# Patient Record
Sex: MALE | Race: WHITE | NOT HISPANIC OR LATINO | Employment: OTHER | URBAN - METROPOLITAN AREA
[De-identification: names, ages, dates, MRNs, and addresses within clinical notes are randomized per-mention and may not be internally consistent; named-entity substitution may affect disease eponyms.]

---

## 2017-03-28 DIAGNOSIS — Z01.812 PRE-OPERATIVE LABORATORY EXAMINATION: ICD-10-CM

## 2017-03-28 DIAGNOSIS — Z01.810 PRE-OPERATIVE CARDIOVASCULAR EXAMINATION: ICD-10-CM

## 2017-03-28 LAB
ANION GAP SERPL CALC-SCNC: 9 MMOL/L (ref 0–11.9)
BUN SERPL-MCNC: 12 MG/DL (ref 8–22)
CALCIUM SERPL-MCNC: 9.4 MG/DL (ref 8.5–10.5)
CHLORIDE SERPL-SCNC: 99 MMOL/L (ref 96–112)
CO2 SERPL-SCNC: 25 MMOL/L (ref 20–33)
CREAT SERPL-MCNC: 0.81 MG/DL (ref 0.5–1.4)
ERYTHROCYTE [DISTWIDTH] IN BLOOD BY AUTOMATED COUNT: 41.8 FL (ref 35.9–50)
GFR SERPL CREATININE-BSD FRML MDRD: >60 ML/MIN/1.73 M 2
GLUCOSE SERPL-MCNC: 104 MG/DL (ref 65–99)
HCT VFR BLD AUTO: 45.1 % (ref 42–52)
HGB BLD-MCNC: 15.8 G/DL (ref 14–18)
MCH RBC QN AUTO: 31.5 PG (ref 27–33)
MCHC RBC AUTO-ENTMCNC: 35 G/DL (ref 33.7–35.3)
MCV RBC AUTO: 89.8 FL (ref 81.4–97.8)
PLATELET # BLD AUTO: 318 K/UL (ref 164–446)
PMV BLD AUTO: 9.2 FL (ref 9–12.9)
POTASSIUM SERPL-SCNC: 3.8 MMOL/L (ref 3.6–5.5)
RBC # BLD AUTO: 5.02 M/UL (ref 4.7–6.1)
SODIUM SERPL-SCNC: 133 MMOL/L (ref 135–145)
WBC # BLD AUTO: 8.1 K/UL (ref 4.8–10.8)

## 2017-03-28 PROCEDURE — 85027 COMPLETE CBC AUTOMATED: CPT

## 2017-03-28 PROCEDURE — 36415 COLL VENOUS BLD VENIPUNCTURE: CPT

## 2017-03-28 PROCEDURE — 80048 BASIC METABOLIC PNL TOTAL CA: CPT

## 2017-03-30 LAB — EKG IMPRESSION: NORMAL

## 2017-04-04 ENCOUNTER — TELEPHONE (OUTPATIENT)
Dept: MEDICAL GROUP | Facility: MEDICAL CENTER | Age: 67
End: 2017-04-04

## 2017-04-04 ENCOUNTER — HOSPITAL ENCOUNTER (INPATIENT)
Facility: MEDICAL CENTER | Age: 67
LOS: 3 days | DRG: 331 | End: 2017-04-07
Attending: SURGERY | Admitting: SURGERY
Payer: MEDICARE

## 2017-04-04 PROBLEM — D12.6 BENIGN NEOPLASM OF COLON: Status: ACTIVE | Noted: 2017-04-04

## 2017-04-04 PROCEDURE — 502626 HCHG SURGIFLO HEMOSTATIC MATRIX 6ML: Performed by: SURGERY

## 2017-04-04 PROCEDURE — 700105 HCHG RX REV CODE 258: Performed by: SURGERY

## 2017-04-04 PROCEDURE — 160031 HCHG SURGERY MINUTES - 1ST 30 MINS LEVEL 5: Performed by: SURGERY

## 2017-04-04 PROCEDURE — 500515 HCHG ENDOCLOSE SUTURING DEVICE: Performed by: SURGERY

## 2017-04-04 PROCEDURE — 502714 HCHG ROBOTIC SURGERY SERVICES: Performed by: SURGERY

## 2017-04-04 PROCEDURE — 700111 HCHG RX REV CODE 636 W/ 250 OVERRIDE (IP): Performed by: SURGERY

## 2017-04-04 PROCEDURE — 500378 HCHG DRAIN, J-VAC ROUND 19FR: Performed by: SURGERY

## 2017-04-04 PROCEDURE — 501570 HCHG TROCAR, SEPARATOR: Performed by: SURGERY

## 2017-04-04 PROCEDURE — 770006 HCHG ROOM/CARE - MED/SURG/GYN SEMI*

## 2017-04-04 PROCEDURE — 501428 HCHG STAPLER, CURVED: Performed by: SURGERY

## 2017-04-04 PROCEDURE — 502648 HCHG APPLICATOR, EVICEL: Performed by: SURGERY

## 2017-04-04 PROCEDURE — 500389 HCHG DRAIN, RESERVOIR SUCT JP 100CC: Performed by: SURGERY

## 2017-04-04 PROCEDURE — 700101 HCHG RX REV CODE 250

## 2017-04-04 PROCEDURE — 160036 HCHG PACU - EA ADDL 30 MINS PHASE I: Performed by: SURGERY

## 2017-04-04 PROCEDURE — 110371 HCHG SHELL REV 272: Performed by: SURGERY

## 2017-04-04 PROCEDURE — 500002 HCHG ADHESIVE, DERMABOND: Performed by: SURGERY

## 2017-04-04 PROCEDURE — 110382 HCHG SHELL REV 271: Performed by: SURGERY

## 2017-04-04 PROCEDURE — 501838 HCHG SUTURE GENERAL: Performed by: SURGERY

## 2017-04-04 PROCEDURE — A4314 CATH W/DRAINAGE 2-WAY LATEX: HCPCS | Performed by: SURGERY

## 2017-04-04 PROCEDURE — 160009 HCHG ANES TIME/MIN: Performed by: SURGERY

## 2017-04-04 PROCEDURE — 160002 HCHG RECOVERY MINUTES (STAT): Performed by: SURGERY

## 2017-04-04 PROCEDURE — 500025 HCHG ARMREST, CRADLE FOAM: Performed by: SURGERY

## 2017-04-04 PROCEDURE — 160048 HCHG OR STATISTICAL LEVEL 1-5: Performed by: SURGERY

## 2017-04-04 PROCEDURE — 0DBN0ZZ EXCISION OF SIGMOID COLON, OPEN APPROACH: ICD-10-PCS | Performed by: SURGERY

## 2017-04-04 PROCEDURE — 0WJP4ZZ INSPECTION OF GASTROINTESTINAL TRACT, PERCUTANEOUS ENDOSCOPIC APPROACH: ICD-10-PCS | Performed by: SURGERY

## 2017-04-04 PROCEDURE — A4606 OXYGEN PROBE USED W OXIMETER: HCPCS | Performed by: SURGERY

## 2017-04-04 PROCEDURE — 700111 HCHG RX REV CODE 636 W/ 250 OVERRIDE (IP)

## 2017-04-04 PROCEDURE — 501338 HCHG SHEARS, ENDO: Performed by: SURGERY

## 2017-04-04 PROCEDURE — 0DBP0ZZ EXCISION OF RECTUM, OPEN APPROACH: ICD-10-PCS | Performed by: SURGERY

## 2017-04-04 PROCEDURE — A9270 NON-COVERED ITEM OR SERVICE: HCPCS

## 2017-04-04 PROCEDURE — 700102 HCHG RX REV CODE 250 W/ 637 OVERRIDE(OP): Performed by: SURGERY

## 2017-04-04 PROCEDURE — 700104 HCHG RX REV CODE 254

## 2017-04-04 PROCEDURE — 160042 HCHG SURGERY MINUTES - EA ADDL 1 MIN LEVEL 5: Performed by: SURGERY

## 2017-04-04 PROCEDURE — 8E0W4CZ ROBOTIC ASSISTED PROCEDURE OF TRUNK REGION, PERCUTANEOUS ENDOSCOPIC APPROACH: ICD-10-PCS | Performed by: SURGERY

## 2017-04-04 PROCEDURE — A4450 NON-WATERPROOF TAPE: HCPCS | Performed by: SURGERY

## 2017-04-04 PROCEDURE — 700102 HCHG RX REV CODE 250 W/ 637 OVERRIDE(OP)

## 2017-04-04 PROCEDURE — 88307 TISSUE EXAM BY PATHOLOGIST: CPT

## 2017-04-04 PROCEDURE — 700101 HCHG RX REV CODE 250: Performed by: SURGERY

## 2017-04-04 PROCEDURE — A9270 NON-COVERED ITEM OR SERVICE: HCPCS | Performed by: SURGERY

## 2017-04-04 PROCEDURE — A6402 STERILE GAUZE <= 16 SQ IN: HCPCS | Performed by: SURGERY

## 2017-04-04 PROCEDURE — 160035 HCHG PACU - 1ST 60 MINS PHASE I: Performed by: SURGERY

## 2017-04-04 PROCEDURE — 502240 HCHG MISC OR SUPPLY RC 0272: Performed by: SURGERY

## 2017-04-04 RX ORDER — KETOROLAC TROMETHAMINE 30 MG/ML
15 INJECTION, SOLUTION INTRAMUSCULAR; INTRAVENOUS EVERY 6 HOURS
Status: DISPENSED | OUTPATIENT
Start: 2017-04-04 | End: 2017-04-07

## 2017-04-04 RX ORDER — DIPHENHYDRAMINE HCL 25 MG
25 TABLET ORAL EVERY 6 HOURS PRN
Status: DISCONTINUED | OUTPATIENT
Start: 2017-04-04 | End: 2017-04-07 | Stop reason: HOSPADM

## 2017-04-04 RX ORDER — GABAPENTIN 300 MG/1
CAPSULE ORAL
Status: COMPLETED
Start: 2017-04-04 | End: 2017-04-04

## 2017-04-04 RX ORDER — SODIUM CHLORIDE, SODIUM LACTATE, POTASSIUM CHLORIDE, CALCIUM CHLORIDE 600; 310; 30; 20 MG/100ML; MG/100ML; MG/100ML; MG/100ML
1000 INJECTION, SOLUTION INTRAVENOUS
Status: COMPLETED | OUTPATIENT
Start: 2017-04-04 | End: 2017-04-04

## 2017-04-04 RX ORDER — MORPHINE SULFATE 4 MG/ML
4 INJECTION, SOLUTION INTRAMUSCULAR; INTRAVENOUS
Status: DISCONTINUED | OUTPATIENT
Start: 2017-04-04 | End: 2017-04-07 | Stop reason: HOSPADM

## 2017-04-04 RX ORDER — ONDANSETRON 2 MG/ML
4 INJECTION INTRAMUSCULAR; INTRAVENOUS EVERY 4 HOURS PRN
Status: DISCONTINUED | OUTPATIENT
Start: 2017-04-04 | End: 2017-04-07 | Stop reason: HOSPADM

## 2017-04-04 RX ORDER — CELECOXIB 200 MG/1
CAPSULE ORAL
Status: COMPLETED
Start: 2017-04-04 | End: 2017-04-04

## 2017-04-04 RX ORDER — ALLOPURINOL 300 MG/1
300 TABLET ORAL DAILY
Status: DISCONTINUED | OUTPATIENT
Start: 2017-04-04 | End: 2017-04-07 | Stop reason: HOSPADM

## 2017-04-04 RX ORDER — DEXTROSE MONOHYDRATE, SODIUM CHLORIDE, AND POTASSIUM CHLORIDE 50; 1.49; 9 G/1000ML; G/1000ML; G/1000ML
INJECTION, SOLUTION INTRAVENOUS CONTINUOUS
Status: DISCONTINUED | OUTPATIENT
Start: 2017-04-04 | End: 2017-04-05

## 2017-04-04 RX ORDER — BUPIVACAINE HYDROCHLORIDE AND EPINEPHRINE 5; 5 MG/ML; UG/ML
INJECTION, SOLUTION EPIDURAL; INTRACAUDAL; PERINEURAL
Status: DISCONTINUED | OUTPATIENT
Start: 2017-04-04 | End: 2017-04-04 | Stop reason: HOSPADM

## 2017-04-04 RX ORDER — AMLODIPINE BESYLATE 10 MG/1
5 TABLET ORAL
Status: DISCONTINUED | OUTPATIENT
Start: 2017-04-05 | End: 2017-04-07 | Stop reason: HOSPADM

## 2017-04-04 RX ORDER — LIDOCAINE HYDROCHLORIDE 10 MG/ML
INJECTION, SOLUTION INFILTRATION; PERINEURAL
Status: COMPLETED
Start: 2017-04-04 | End: 2017-04-04

## 2017-04-04 RX ORDER — OXYCODONE HYDROCHLORIDE 5 MG/1
5 TABLET ORAL
Status: DISCONTINUED | OUTPATIENT
Start: 2017-04-04 | End: 2017-04-07 | Stop reason: HOSPADM

## 2017-04-04 RX ORDER — VALSARTAN 80 MG/1
160 TABLET ORAL DAILY
Status: DISCONTINUED | OUTPATIENT
Start: 2017-04-04 | End: 2017-04-07 | Stop reason: HOSPADM

## 2017-04-04 RX ORDER — OXYCODONE HCL 10 MG/1
TABLET, FILM COATED, EXTENDED RELEASE ORAL
Status: DISPENSED
Start: 2017-04-04 | End: 2017-04-04

## 2017-04-04 RX ORDER — OXYCODONE HYDROCHLORIDE 10 MG/1
10 TABLET ORAL
Status: DISCONTINUED | OUTPATIENT
Start: 2017-04-04 | End: 2017-04-07 | Stop reason: HOSPADM

## 2017-04-04 RX ORDER — TRIAMTERENE AND HYDROCHLOROTHIAZIDE 37.5; 25 MG/1; MG/1
1 TABLET ORAL
Status: DISCONTINUED | OUTPATIENT
Start: 2017-04-04 | End: 2017-04-07 | Stop reason: HOSPADM

## 2017-04-04 RX ORDER — ACETAMINOPHEN 500 MG
TABLET ORAL
Status: COMPLETED
Start: 2017-04-04 | End: 2017-04-04

## 2017-04-04 RX ORDER — ACETAMINOPHEN 500 MG
1000 TABLET ORAL EVERY 6 HOURS
Status: DISCONTINUED | OUTPATIENT
Start: 2017-04-04 | End: 2017-04-07 | Stop reason: HOSPADM

## 2017-04-04 RX ADMIN — TRIAMTERENE AND HYDROCHLOROTHIAZIDE 1 TABLET: 37.5; 25 TABLET ORAL at 13:01

## 2017-04-04 RX ADMIN — KETOROLAC TROMETHAMINE 15 MG: 30 INJECTION, SOLUTION INTRAMUSCULAR; INTRAVENOUS at 12:52

## 2017-04-04 RX ADMIN — CELECOXIB 400 MG: 200 CAPSULE ORAL at 07:30

## 2017-04-04 RX ADMIN — ALLOPURINOL 300 MG: 300 TABLET ORAL at 12:53

## 2017-04-04 RX ADMIN — LIDOCAINE HYDROCHLORIDE: 10 INJECTION, SOLUTION INFILTRATION; PERINEURAL at 06:15

## 2017-04-04 RX ADMIN — ACETAMINOPHEN 1000 MG: 500 TABLET, FILM COATED ORAL at 17:39

## 2017-04-04 RX ADMIN — KETOROLAC TROMETHAMINE 15 MG: 30 INJECTION, SOLUTION INTRAMUSCULAR; INTRAVENOUS at 23:13

## 2017-04-04 RX ADMIN — ACETAMINOPHEN 1000 MG: 500 TABLET, FILM COATED ORAL at 12:54

## 2017-04-04 RX ADMIN — GABAPENTIN 300 MG: 300 CAPSULE ORAL at 07:30

## 2017-04-04 RX ADMIN — KETOROLAC TROMETHAMINE 15 MG: 30 INJECTION, SOLUTION INTRAMUSCULAR; INTRAVENOUS at 17:39

## 2017-04-04 RX ADMIN — POTASSIUM CHLORIDE, DEXTROSE MONOHYDRATE AND SODIUM CHLORIDE: 150; 5; 900 INJECTION, SOLUTION INTRAVENOUS at 12:55

## 2017-04-04 RX ADMIN — CEFOTETAN DISODIUM 2 G: 1 INJECTION, POWDER, FOR SOLUTION INTRAMUSCULAR; INTRAVENOUS at 21:52

## 2017-04-04 RX ADMIN — ACETAMINOPHEN 1000 MG: 500 TABLET, FILM COATED ORAL at 07:30

## 2017-04-04 RX ADMIN — SODIUM CHLORIDE, SODIUM LACTATE, POTASSIUM CHLORIDE, CALCIUM CHLORIDE 1000 ML: 600; 310; 30; 20 INJECTION, SOLUTION INTRAVENOUS at 06:25

## 2017-04-04 ASSESSMENT — PAIN SCALES - GENERAL
PAINLEVEL_OUTOF10: 0
PAINLEVEL_OUTOF10: 2
PAINLEVEL_OUTOF10: 0
PAINLEVEL_OUTOF10: 1
PAINLEVEL_OUTOF10: 0

## 2017-04-04 ASSESSMENT — LIFESTYLE VARIABLES
TOTAL SCORE: 0
TOTAL SCORE: 0
EVER_SMOKED: YES
HOW MANY TIMES IN THE PAST YEAR HAVE YOU HAD 5 OR MORE DRINKS IN A DAY: 200
TOTAL SCORE: 0
ON A TYPICAL DAY WHEN YOU DRINK ALCOHOL HOW MANY DRINKS DO YOU HAVE: 4
CONSUMPTION TOTAL: POSITIVE
HAVE PEOPLE ANNOYED YOU BY CRITICIZING YOUR DRINKING: NO
HAVE PEOPLE ANNOYED YOU BY CRITICIZING YOUR DRINKING: NO
TOTAL SCORE: 0
TOTAL SCORE: 0
AVERAGE NUMBER OF DAYS PER WEEK YOU HAVE A DRINK CONTAINING ALCOHOL: 5
EVER HAD A DRINK FIRST THING IN THE MORNING TO STEADY YOUR NERVES TO GET RID OF A HANGOVER: NO
AVERAGE NUMBER OF DAYS PER WEEK YOU HAVE A DRINK CONTAINING ALCOHOL: 5
EVER FELT BAD OR GUILTY ABOUT YOUR DRINKING: NO
ALCOHOL_USE: YES
ON A TYPICAL DAY WHEN YOU DRINK ALCOHOL HOW MANY DRINKS DO YOU HAVE: 4
TOTAL SCORE: 0
HOW MANY TIMES IN THE PAST YEAR HAVE YOU HAD 5 OR MORE DRINKS IN A DAY: 200
CONSUMPTION TOTAL: POSITIVE
EVER FELT BAD OR GUILTY ABOUT YOUR DRINKING: NO
HAVE YOU EVER FELT YOU SHOULD CUT DOWN ON YOUR DRINKING: NO
DO YOU DRINK ALCOHOL: YES
HAVE YOU EVER FELT YOU SHOULD CUT DOWN ON YOUR DRINKING: NO
EVER HAD A DRINK FIRST THING IN THE MORNING TO STEADY YOUR NERVES TO GET RID OF A HANGOVER: NO

## 2017-04-04 ASSESSMENT — PATIENT HEALTH QUESTIONNAIRE - PHQ9
SUM OF ALL RESPONSES TO PHQ QUESTIONS 1-9: 0
1. LITTLE INTEREST OR PLEASURE IN DOING THINGS: NOT AT ALL
2. FEELING DOWN, DEPRESSED, IRRITABLE, OR HOPELESS: NOT AT ALL
SUM OF ALL RESPONSES TO PHQ9 QUESTIONS 1 AND 2: 0

## 2017-04-04 NOTE — TELEPHONE ENCOUNTER
Future Appointments      Provider Department Center   4/17/2017 9:40 AM Darrius Magaña M.D.; [facility] [address] [facility]     ANNUAL WELLNESS VISIT PRE-VISIT PLANNING     1.  Reviewed last PCP office visit assessment and plan notes: Yes  2.  If any orders were placed last visit do we have Results/Consult Notes?        •  Labs? Not completed        •  Imaging? No        •  Referrals? No   3.   Patient Care Coordination Note was updated with  Diagnosis information: yes no note  4.   Updated immunizations by using WebIZ?: yes  · Is patient due for Shingles? Yes.  If yes, was patient alerted of copay? yes   5.   Has the patient had the flu vaccine this season? Yes.  6.   Has patient had the pneumococcal vaccine? Yes. If yes which vaccine was administered? 13 When was it administered? 12/27/16  7.   Last office visit 12/27/16          Patient is due for these Health Maintenance Topics:   Health Maintenance Due   Topic Date Due   • Annual Wellness Visit  Scheduled for 04/17/17   • IMM ZOSTER VACCINE  04/28/2011     8.    Updated Care Team with [employer] and all specialists?        •   Gait devices, O2, CPAP, etc: no        •   Other specialists (GYN, cardiology, endo, etc): yes        •   Eye professional: yes When was last eye exam? 6-7 months        •   OUSMANE letter will be faxed to:  Eye Dr for Retinal Exam records    9. DPA/Advanced Directive:  Patient does not have an Advanced Directive.  A packet and workshop information was given on Advanced Directives.    10. Patient has: No chronic diseases to review    11.  Is patient in need of any refills prior to office visit? No  12. Patient was informed to arrive 15 min prior to their scheduled appointment and bring in their medication bottles? yes  13. Patient was advised: “This is a free wellness visit. The provider will screen for medical conditions to help you stay healthy. If you have other concerns to address you may be asked to discuss  these at a separate visit or there may be an additional fee.”  Yes

## 2017-04-04 NOTE — IP AVS SNAPSHOT
" Home Care Instructions                                                                                                                  Name:Giovanny Pack  Medical Record Number:[id number]  CSN: [id number]    YOB: 1950   Age: 66 y.o.  Sex: male  HT:1.727 m (5' 7.99\") WT: 98.3 kg (216 lb 11.4 oz)          Admit Date: 4/4/2017     Discharge Date:   Today's Date: 4/7/2017  Attending Doctor:  Grayson Chan M.D.                  Allergies:  Review of patient's allergies indicates no known allergies.            Discharge Instructions       D/c IV  PT MAY SHOWER  NO LIFTING >20 LBS  LEAVE DRAIN IN PLACE    Discharge Instructions    Discharged to home by car with relative. Discharged via wheelchair, hospital escort: Yes.  Special equipment needed: Not Applicable    Be sure to schedule a follow-up appointment with your primary care doctor or any specialists as instructed.     Discharge Plan:   Diet Plan: Discussed  Activity Level: Discussed  Confirmed Follow up Appointment: Patient to Call and Schedule Appointment  Confirmed Symptoms Management: Discussed  Medication Reconciliation Updated: Yes  Influenza Vaccine Indication: Not indicated: Previously immunized this influenza season and > 8 years of age    I understand that a diet low in cholesterol, fat, and sodium is recommended for good health. Unless I have been given specific instructions below for another diet, I accept this instruction as my diet prescription.   Other diet: Regular    Special Instructions: None    · Is patient discharged on Warfarin / Coumadin?   No     · Is patient Post Blood Transfusion?  No    Depression / Suicide Risk    As you are discharged from this [facility] facility, it is important to learn how to keep safe from harming yourself.    Recognize the warning signs:  · Abrupt changes in personality, positive or negative- including increase in energy   · Giving away possessions  · Change in eating patterns- significant weight " changes-  positive or negative  · Change in sleeping patterns- unable to sleep or sleeping all the time   · Unwillingness or inability to communicate  · Depression  · Unusual sadness, discouragement and loneliness  · Talk of wanting to die  · Neglect of personal appearance   · Rebelliousness- reckless behavior  · Withdrawal from people/activities they love  · Confusion- inability to concentrate     If you or a loved one observes any of these behaviors or has concerns about self-harm, here's what you can do:  · Talk about it- your feelings and reasons for harming yourself  · Remove any means that you might use to hurt yourself (examples: pills, rope, extension cords, firearm)  · Get professional help from the community (Mental Health, Substance Abuse, psychological counseling)  · Do not be alone:Call your Safe Contact- someone whom you trust who will be there for you.  · Call your local CRISIS HOTLINE 710-5490 or 086-640-2731  · Call your local Children's Mobile Crisis Response Team [place] (902) 312-2082 or www.MediaLAB  · Call the toll free National Suicide Prevention Hotlines   · National Suicide Prevention Lifeline 946-683-ALAG (5606)  · National Hope Line Network 800-SUICIDE (183-9391)        Your appointments     Apr 17, 2017  9:40 AM   ANNUAL WELLNESS with Darrius Magaña M.D., [facility]    [address] ([address] [employer])    [address] [place] [address] [address]  [employer] [place] [address] [phone]              Follow-up Information     1. Follow up with Grayson Chan M.D.. Call in 1 week.    Specialties:  Surgery, Radiology    Contact information     [address]  [address]  [employer] [place] [address] [phone]          2. Schedule an appointment as soon as possible for a visit with Darrius Magaña M.D..    Specialty:  Internal Medicine    Why:  As needed    Contact information     [address] [facility]  [address]  [employer] [place] [id number] [phone]           Discharge Medication  Instructions:    Below are the medications your physician expects you to take upon discharge:    Review all your home medications and newly ordered medications with your doctor and/or pharmacist. Follow medication instructions as directed by your doctor and/or pharmacist.    Please keep your medication list with you and share with your physician.               Medication List      START taking these medications        Instructions    oxycodone-acetaminophen 5-325 MG Tabs   Commonly known as:  PERCOCET    Take 1-2 Tabs by mouth every four hours as needed (PAIN).   Dose:  1-2 Tab         CONTINUE taking these medications        Instructions    allopurinol 300 MG Tabs   Last time this was given:  300 mg on 4/7/2017  7:40 AM   Commonly known as:  ZYLOPRIM    TAKE 1 TABLET BY MOUTH DAILY       amlodipine 5 MG Tabs   Last time this was given:  5 mg on 4/7/2017  7:39 AM   Commonly known as:  NORVASC    TAKE 1 TABLET BY MOUTH DAILY       triamterene-hctz 37.5-25 MG Tabs   Last time this was given:  1 Tab on 4/7/2017  7:39 AM   Commonly known as:  MAXZIDE-25/DYAZIDE    TAKE 1 TABLET BY MOUTH DAILY       valsartan 160 MG Tabs   Last time this was given:  160 mg on 4/7/2017  7:39 AM   Commonly known as:  DIOVAN    TAKE 1 TAB BY MOUTH EVERY DAY. INDICATIONS: HIGH BLOOD PRESSURE         STOP taking these medications     MIRALAX PO               Instructions           Diet / Nutrition:    Follow any diet instructions given to you by your doctor or the dietician, including how much salt (sodium) you are allowed each day.    If you are overweight, talk to your doctor about a weight reduction plan.    Activity:    Remain physically active following your doctor's instructions about exercise and activity.    Rest often.     Any time you become even a little tired or short of breath, SIT DOWN and rest.    Worsening Symptoms:    Report any of the following signs and symptoms to the doctor's office immediately:    *Pain of jaw, arm, or  neck  *Chest pain not relieved by medication                               *Dizziness or loss of consciousness  *Difficulty breathing even when at rest   *More tired than usual                                       *Bleeding drainage or swelling of surgical site  *Swelling of feet, ankles, legs or stomach                 *Fever (>100ºF)  *Pink or blood tinged sputum  *Weight gain (3lbs/day or 5lbs /week)           *Shock from internal defibrillator (if applicable)  *Palpitations or irregular heartbeats                *Cool and/or numb extremities    Stroke Awareness    Common Risk Factors for Stroke include:    Age  Atrial Fibrillation  Carotid Artery Stenosis  Diabetes Mellitus  Excessive alcohol consumption  High blood pressure  Overweight   Physical inactivity  Smoking    Warning signs and symptoms of a stroke include:    *Sudden numbness or weakness of the face, arm or leg (especially on one side of the body).  *Sudden confusion, trouble speaking or understanding.  *Sudden trouble seeing in one or both eyes.  *Sudden trouble walking, dizziness, loss of balance or coordination.Sudden severe headache with no known cause.    It is very important to get treatment quickly when a stroke occurs. If you experience any of the above warning signs, call 911 immediately.                   Disclaimer         Quit Smoking / Tobacco Use:    I understand the use of any tobacco products increases my chance of suffering from future heart disease or stroke and could cause other illnesses which may shorten my life. Quitting the use of tobacco products is the single most important thing I can do to improve my health. For further information on smoking / tobacco cessation call a Toll Free Quit Line at 1-983.300.8228 (*National [employer] [place]) or 1-145.470.7980 (American Lung Association) or you can access the web based program at www.lungusa.org.    [place] Tobacco Users Help Line:  (836) 915-4174       Toll Free:  2-456-874-5203  Quit Tobacco Program [facility] (823)834-5825    Crisis Hotline:    National Crisis Hotline:  3-618-LAOYIVJ or 1-601.292.3242    [place] Crisis Hotline:    1-804.658.1503 or 712-578-2737    Discharge Survey:   Thank you for choosing [facility]. We hope we did everything we could to make your hospital stay a pleasant one. You may be receiving a phone survey and we would appreciate your time and participation in answering the questions. Your input is very valuable to us in our efforts to improve our service to our patients and their families.        My signature on this form indicates that:    1. I have reviewed and understand the above information.  2. My questions regarding this information have been answered to my satisfaction.  3. I have formulated a plan with my discharge nurse to obtain my prescribed medications for home.                  Disclaimer         __________________________________                     __________       ________                       Patient Signature                                                 Date                    Time

## 2017-04-04 NOTE — IP AVS SNAPSHOT
" <p align=\"LEFT\"><IMG SRC=\"//[email or web address]$/[email or web address]\" alt=\"Image\" WIDTH=\"50%\" HEIGHT=\"200\" BORDER=\"\"></p>                   Name:Giovanny Pack  Medical Record Number:[id number]  CSN: [id number]    YOB: 1950   Age: 66 y.o.  Sex: male  HT:1.727 m (5' 7.99\") WT: 98.3 kg (216 lb 11.4 oz)          Admit Date: 4/4/2017     Discharge Date:   Today's Date: 4/7/2017  Attending Doctor:  Grayson Chan M.D.                  Allergies:  Review of patient's allergies indicates no known allergies.          Your appointments     Apr 17, 2017  9:40 AM   ANNUAL WELLNESS with Darri[facility] Magaña M.D., [facility]   [address])    [address] [place] [id number] [phone]              Follow-up Information     1. Follow up with Grayson Chan M.D.. Call in 1 week.    Specialties:  Surgery, Radiology    Contact information    [address] [place] [address] [address]  [address]  [facility] [address] [phone]          2. Schedule an appointment as soon as possible for a visit with Darrius Magaña M.D..    Specialty:  Internal Medicine    Why:  As needed    Contact information    [address] [place] [id number] [phone]           Medication List      Take these Medications        Instructions    allopurinol 300 MG Tabs   Commonly known as:  ZYLOPRIM    TAKE 1 TABLET BY MOUTH DAILY       amlodipine 5 MG Tabs   Commonly known as:  NORVASC    TAKE 1 TABLET BY MOUTH DAILY       oxycodone-acetaminophen 5-325 MG Tabs   Commonly known as:  PERCOCET    Take 1-2 Tabs by mouth every four hours as needed (PAIN).   Dose:  1-2 Tab       triamterene-hctz 37.5-25 MG Tabs   Commonly known as:  MAXZIDE-25/DYAZIDE    TAKE 1 TABLET BY MOUTH DAILY       valsartan 160 MG Tabs   Commonly known as:  DIOVAN    TAKE 1 TAB BY MOUTH EVERY DAY. INDICATIONS: HIGH BLOOD PRESSURE         "

## 2017-04-04 NOTE — OR NURSING
Pt up and standing at side of bed/walking in place - tolerated well.  Will be transported to floor by wheelchair.  Continues to deny pain.  Maral AGUILAR will assume care of pt while waiting for transport.

## 2017-04-04 NOTE — PROGRESS NOTES
Received PACU report and assumed care of patient upon arrival to [facility] in T427-1.  Assessment complete; discussed POC with patient.  AO x4, patient calls for assistance.  Patient appears calm and exhibits no signs of distress.  Patient reports pain of 1/10, will medicate per MAR PRN.  5x lap stabs KIRTI with dermabond, left abdominal ALEXANDER in place with moderate sanguinous drainage.  Patient tolerating current diet.  BS hypoactive x 4, LBM PTA 4/4/17, patient voids with norwood in place.  Patient is standby assist, gait steady, walked from gurney to bed.  Call light within reach, bed in lowest position, SCDs in use, bed alarm refused.  Will continue to monitor pt for changes in status and provide care.

## 2017-04-04 NOTE — IP AVS SNAPSHOT
Greenpie Access Code: Activation code not generated  Current Greenpie Status: Active    IMAGINATE - [employer]  A secure, online tool to manage your health information     Technology [employer]’s Greenpie® is a secure, online tool that connects you to your personalized health information from the privacy of your home -- day or night - making it very easy for you to manage your healthcare. Once the activation process is completed, you can even access your medical information using the Greenpie lisbeth, which is available for free in the Apple Lisbeth store or Google Play store.     Greenpie provides the following levels of access (as shown below):   My Chart Features   [facility] [place] [facility] Primary Care Doctor [facility] [place] [facility]  Specialists [facility] [place] [facility]  Urgent  Care Non-[facility]  Primary Care  Doctor   Email your healthcare team securely and privately 24/7 X X X X   Manage appointments: schedule your next appointment; view details of past/upcoming appointments X      Request prescription refills. X      View recent personal medical records, including lab and immunizations X X X X   View health record, including health history, allergies, medications X X X X   Read reports about your outpatient visits, procedures, consult and ER notes X X X X   See your discharge summary, which is a recap of your hospital and/or ER visit that includes your diagnosis, lab results, and care plan. X X       How to register for Greenpie:  1. Go to  [email or web address].  2. Click on the Sign Up Now box, which takes you to the New Member Sign Up page. You will need to provide the following information:  a. Enter your Greenpie Access Code exactly as it appears at the top of this page. (You will not need to use this code after you’ve completed the sign-up process. If you do not sign up before the expiration date, you must request a new code.)   b. Enter your date of birth.   c. Enter your home email address.   d. Click Submit, and follow the next screen’s instructions.  3. Create a Greenpie ID. This will  be your [employer] login ID and cannot be changed, so think of one that is secure and easy to remember.  4. Create a [employer] password. You can change your password at any time.  5. Enter your Password Reset Question and Answer. This can be used at a later time if you forget your password.   6. Enter your e-mail address. This allows you to receive e-mail notifications when new information is available in [employer].  7. Click Sign Up. You can now view your health information.    For assistance activating your [employer] account, call [phone]

## 2017-04-04 NOTE — CARE PLAN
Problem: Pain  Goal: Alleviation of Pain or a reduction in pain to the patient’s comfort goal  Outcome: PROGRESSING AS EXPECTED  Assessed pain and medicated per MAR.  Reminded patient to report any changes in severity or quality of pain in order to best manage pain.    Problem: Risk for Infection, Impaired Wound Healing  Goal: Remain free from signs and symptoms of infection  Outcome: PROGRESSING AS EXPECTED  Reminded patient of the signs and symptoms of infection and encouraged patient to report any of these findings in order to promote best outcomes.

## 2017-04-04 NOTE — IP AVS SNAPSHOT
4/7/2017          Giovanny Pack  [address]  [place] [address]    Dear Giovanny:    [facility] wants to ensure your discharge home is safe and you or your loved ones have had all your questions answered regarding your care after you leave the hospital.    You may receive a telephone call within two days of your discharge.  This call is to make certain you understand your discharge instructions as well as ensure we provided you with the best care possible during your stay with us.     The call will only last approximately 3-5 minutes and will be done by a nurse.    Once again, we want to ensure your discharge home is safe and that you have a clear understanding of any next steps in your care.  If you have any questions or concerns, please do not hesitate to contact us, we are here for you.  Thank you for choosing [facility] for your healthcare needs.    Sincerely,    Morgan Jeffers    [facility]

## 2017-04-04 NOTE — OR SURGEON
Immediate Post-Operative Note      PreOp Diagnosis: Colon Polyp    PostOp Diagnosis: same    Procedure(s):  LOW ANTERIOR RESECTION ROBOTIC XI - Wound Class: Clean Contaminated    Surgeon(s):  Grayson Chan M.D.    Anesthesiologist/Type of Anesthesia:  Anesthesiologist: Hailee Romero M.D./General    Surgical Staff:  Circulator: Teddy Castano R.N.; Kassy Osborn R.N.  Relief Circulator: Abida Caicedo R.N.  Scrub Person: Marlene Adams; Lavon Song  First Assist: YOUNG Hu    Specimen: Sigmoid Colon and Proximal Rectum    Estimated Blood Loss: 25cc    Findings: Fatty mesentery, Sigmoid Colon removed down to proximal rectum.  ColoRectal Anastomosis with hemostasis.  Air leak on insufflation test, anterior anastomotic line sutured with resolution of air leak.  19F round drain placed in pelvis.      Complications: none        4/4/2017 10:09 AM Grayson Chan

## 2017-04-04 NOTE — OR NURSING
Pt's contact (Terri) called, message left alerting her pt in recovery and doing well.  Denies pain t/o time in pacu.  Taking apple juice and tolerating well.

## 2017-04-05 LAB
ANION GAP SERPL CALC-SCNC: 8 MMOL/L (ref 0–11.9)
BUN SERPL-MCNC: 20 MG/DL (ref 8–22)
CALCIUM SERPL-MCNC: 9.2 MG/DL (ref 8.5–10.5)
CHLORIDE SERPL-SCNC: 103 MMOL/L (ref 96–112)
CO2 SERPL-SCNC: 26 MMOL/L (ref 20–33)
CREAT SERPL-MCNC: 1.01 MG/DL (ref 0.5–1.4)
ERYTHROCYTE [DISTWIDTH] IN BLOOD BY AUTOMATED COUNT: 44.3 FL (ref 35.9–50)
GFR SERPL CREATININE-BSD FRML MDRD: >60 ML/MIN/1.73 M 2
GLUCOSE SERPL-MCNC: 146 MG/DL (ref 65–99)
HCT VFR BLD AUTO: 40.4 % (ref 42–52)
HGB BLD-MCNC: 13.6 G/DL (ref 14–18)
MAGNESIUM SERPL-MCNC: 1.9 MG/DL (ref 1.5–2.5)
MCH RBC QN AUTO: 31.3 PG (ref 27–33)
MCHC RBC AUTO-ENTMCNC: 33.7 G/DL (ref 33.7–35.3)
MCV RBC AUTO: 92.9 FL (ref 81.4–97.8)
PLATELET # BLD AUTO: 246 K/UL (ref 164–446)
PMV BLD AUTO: 9.1 FL (ref 9–12.9)
POTASSIUM SERPL-SCNC: 4 MMOL/L (ref 3.6–5.5)
RBC # BLD AUTO: 4.35 M/UL (ref 4.7–6.1)
SODIUM SERPL-SCNC: 137 MMOL/L (ref 135–145)
WBC # BLD AUTO: 16.7 K/UL (ref 4.8–10.8)

## 2017-04-05 PROCEDURE — 83735 ASSAY OF MAGNESIUM: CPT

## 2017-04-05 PROCEDURE — 85027 COMPLETE CBC AUTOMATED: CPT

## 2017-04-05 PROCEDURE — 700111 HCHG RX REV CODE 636 W/ 250 OVERRIDE (IP): Performed by: SURGERY

## 2017-04-05 PROCEDURE — 770006 HCHG ROOM/CARE - MED/SURG/GYN SEMI*

## 2017-04-05 PROCEDURE — 80048 BASIC METABOLIC PNL TOTAL CA: CPT

## 2017-04-05 PROCEDURE — 36415 COLL VENOUS BLD VENIPUNCTURE: CPT

## 2017-04-05 PROCEDURE — 700102 HCHG RX REV CODE 250 W/ 637 OVERRIDE(OP): Performed by: SURGERY

## 2017-04-05 PROCEDURE — 700101 HCHG RX REV CODE 250: Performed by: SURGERY

## 2017-04-05 PROCEDURE — A9270 NON-COVERED ITEM OR SERVICE: HCPCS | Performed by: SURGERY

## 2017-04-05 RX ADMIN — ACETAMINOPHEN 1000 MG: 500 TABLET, FILM COATED ORAL at 21:20

## 2017-04-05 RX ADMIN — AMLODIPINE BESYLATE 5 MG: 10 TABLET ORAL at 09:38

## 2017-04-05 RX ADMIN — VALSARTAN 160 MG: 80 TABLET ORAL at 09:38

## 2017-04-05 RX ADMIN — ACETAMINOPHEN 1000 MG: 500 TABLET, FILM COATED ORAL at 05:02

## 2017-04-05 RX ADMIN — KETOROLAC TROMETHAMINE 15 MG: 30 INJECTION, SOLUTION INTRAMUSCULAR; INTRAVENOUS at 21:21

## 2017-04-05 RX ADMIN — KETOROLAC TROMETHAMINE 15 MG: 30 INJECTION, SOLUTION INTRAMUSCULAR; INTRAVENOUS at 05:02

## 2017-04-05 RX ADMIN — POTASSIUM CHLORIDE, DEXTROSE MONOHYDRATE AND SODIUM CHLORIDE: 150; 5; 900 INJECTION, SOLUTION INTRAVENOUS at 05:03

## 2017-04-05 RX ADMIN — ENOXAPARIN SODIUM 40 MG: 100 INJECTION SUBCUTANEOUS at 09:37

## 2017-04-05 RX ADMIN — TRIAMTERENE AND HYDROCHLOROTHIAZIDE 1 TABLET: 37.5; 25 TABLET ORAL at 09:38

## 2017-04-05 RX ADMIN — ALLOPURINOL 300 MG: 300 TABLET ORAL at 09:37

## 2017-04-05 ASSESSMENT — PAIN SCALES - GENERAL
PAINLEVEL_OUTOF10: 1
PAINLEVEL_OUTOF10: 1
PAINLEVEL_OUTOF10: 3

## 2017-04-05 NOTE — DISCHARGE PLANNING
Care Transition Team Assessment    Information Source  Orientation : Oriented x 4  Information Given By: Patient  Informant's Name: Giovanny  Who is responsible for making decisions for patient? : Patient    Readmission Evaluation  Is this a readmission?: No    Elopement Risk  Legal Hold: No  Ambulatory or Self Mobile in Wheelchair: Yes  Disoriented: No  Psychiatric Symptoms: None  History of Wandering: No  Elopement this Admit: No  Vocalizing Wanting to Leave: No  Displays Behaviors, Body Language Wanting to Leave: No-Not at Risk for Elopement  Elopement Risk: Not at Risk for Elopement    Interdisciplinary Discharge Planning  Does Admitting Nurse Feel This Could be a Complex Discharge?: No  Primary Care Physician: Dr. Darrius Magaña  Lives with - Patient's Self Care Capacity: Alone and Able to Care For Self  Patient or legal guardian wants to designate a caregiver (see row info): No  Support Systems: Family Member(s)  Housing / Facility: 2 Story Apartment / Condo  Do You Take your Prescribed Medications Regularly: Yes  Able to Return to Previous ADL's: Yes  Mobility Issues: No  Prior Services: None  Patient Expects to be Discharged to:: Home  Assistance Needed: No  Durable Medical Equipment: Not Applicable    Discharge Preparedness  What is your plan after discharge?: Home with help  What are your discharge supports?:  (Family)  Prior Functional Level: Ambulatory, Drives Self, Independent with Activities of Daily Living, Independent with Medication Management  Difficulity with ADLs: None  Difficulity with IADLs: Driving  Difficulity with IADL Comments: Friends can drive pt if needed    Functional Assesment  Prior Functional Level: Ambulatory, Drives Self, Independent with Activities of Daily Living, Independent with Medication Management    Finances  Financial Barriers to Discharge: No  Prescription Coverage: Yes ([place]/ [place])    Vision / Hearing Impairment  Vision Impairment : Yes  Right Eye Vision: Wears  Glasses  Left Eye Vision: Wears Glasses  Hearing Impairment : No    Values / Beliefs / Concerns  Values / Beliefs Concerns : No  Spiritual Requests During Hospitalization: No    Advance Directive  Advance Directive?: None  Advance Directive offered?: AD Booklet refused    Domestic Abuse  Have you ever been the victim of abuse or violence?: No  Physical Abuse or Sexual Abuse: No  Verbal Abuse or Emotional Abuse: No  Possible Abuse Reported to:: Not Applicable    Psychological Assessment  Non-compliant with Treatment: No  Newly Diagnosed Illness: Yes    Discharge Risks or Barriers  Discharge risks or barriers?: No    Anticipated Discharge Information  Anticipated discharge disposition: Home  Discharge Address: [address] [place] [address] [address] [facility] [address]  Discharge Contact Phone Number: [phone]

## 2017-04-05 NOTE — PROGRESS NOTES
Pt walked unit 1 full lap with no assistance, then used IS and achieved 2000mL on first attempt, no SOB, pain 1/10.

## 2017-04-05 NOTE — CARE PLAN
Problem: Pain  Goal: Alleviation of Pain or a reduction in pain to the patient’s comfort goal  Outcome: MET Date Met:  04/05/17  Pt's pain 0-1, scheduled tylenol and toradol refused.     Problem: Venous Thromboembolism (VTW)/Deep Vein Thrombosis (DVT) Prevention:  Goal: Patient will participate in Venous Thrombosis (VTE)/Deep Vein Thrombosis (DVT)Prevention Measures  Outcome: PROGRESSING AS EXPECTED  Ambulated in hallway, lovenox given per MAR    Problem: Urinary Elimination:  Goal: Ability to reestablish a normal urinary elimination pattern will improve  Outcome: PROGRESSING AS EXPECTED  Patient voided post norwood removal.

## 2017-04-05 NOTE — PROGRESS NOTES
"4/5  S:  66 y.o.male s/p Robotic LAR  POD# 1.  Patient doing well overnight, tolerating PO, ambulating, pain controlled, no flatus or BM yet.  No nausea or emesis with liquids PO    O:  Blood pressure 149/87, pulse 60, temperature 36.1 °C (97 °F), resp. rate 18, height 1.727 m (5' 7.99\"), weight 98.3 kg (216 lb 11.4 oz), SpO2 95 %.  I/O last 3 completed shifts:  In: 2240 [P.O.:240; I.V.:2000]  Out: 1280 [Urine:1060; Drains:120]  Recent Labs      04/05/17   0242   SODIUM  137   POTASSIUM  4.0   CHLORIDE  103   CO2  26   GLUCOSE  146*   BUN  20   CREATININE  1.01   CALCIUM  9.2     Recent Labs      04/05/17   0242   WBC  16.7*   RBC  4.35*   HEMOGLOBIN  13.6*   HEMATOCRIT  40.4*   MCV  92.9   MCH  31.3   MCHC  33.7   RDW  44.3   PLATELETCT  246   MPV  9.1       Alert and Oriented x3, No Acute Distress  Normal Respiratory Effort  Abdomen soft, appropriately tender  Incisions/Bandages clean/dry/intact  Extremities warm and well perfused  ALEXANDER Output Serosanguinous- 120cc    A/P:  Pain control with PO meds  Diet as tolerated  Fluids SLIV  Ambulate tid and ad ketty  Shine out this am  Leave drain in place until output falls    "

## 2017-04-05 NOTE — OP REPORT
DATE OF SERVICE:  04/04/2017    PREOPERATIVE DIAGNOSIS:  Colon polyp.    POSTOPERATIVE DIAGNOSIS:  Colon polyp.    PROCEDURE:  Robotic low anterior resection, Firefly assessment of bowel   vasculature.    SURGEON:  Grayson Chan MD    ASSISTANT:  KATHRINE Barrientos.    ANESTHESIA:  General endotracheal.    ANESTHESIOLOGIST:  Hailee Romero MD    ESTIMATED BLOOD LOSS:  50 mL.    SPECIMENS:  Sigmoid colon and proximal rectum.    COMPLICATIONS:  None.    CONDITION:  Stable.    INDICATIONS FOR PROCEDURE:  This is a 66-year-old male, who presents with a   mass found in his sigmoid colon at 30 cm on recent endoscopic exam.  Mass was   tattooed and too large to be removed endoscopically.  The patient was referred   for elective surgery.  Risks, benefits, alternatives of surgery explained to   him before proceeding and he completed the bowel prep.    OPERATIVE FINDINGS:  Unresectable polyp in the sigmoid colon and proximal   rectum removed with GERONIMO pedicle, colorectal anastomosis with hemostasis.    Initial leak on insufflation tests, suture repaired and pelvic drain placed.    OPERATIVE TECHNIQUE:  After informed consent was obtained, the patient was   taken to the operating room, placed in supine position.  After adequate   endotracheal anesthesia was achieved, the patient was switched to lithotomy   position.  The rectum was washed out with Betadine and the abdomen and   perineum were prepped and draped in sterile fashion.  Operation was begun by   placing an 8 mm periumbilical incision through which a 5 mm trocar was   introduced into the abdomen using the Optiview technique.  After   pneumoperitoneum was achieved, additional trocars were placed, 12 mm in the   right lower quadrant and two 8 mm trocars in the left upper quadrant.    Finally, a 5 mm assistant port in the right upper quadrant and the camera port   was upsized to an 8 mm trocar.  All trocars were placed with 0.5% Marcaine   with epinephrine for  local anesthesia.    The pneumoperitoneum was achieved and the patient positioned.  The da Annika Xi   robot was then docked and we began by taking down the left lateral   attachments of the sigmoid colon.  We then dissected retroperitoneally in the   bloodless plane preserving the pelvic nerves and isolating the GERONIMO pedicle.    We identified the left and right ureters and these were preserved throughout   this procedure.  The GERONIMO pedicle was divided with a combination of the vessel   sealer device and a white load staple load due to the fatty state of the   mesentery.  With this completed, we divided the mesentery up to the descending   colon for planned anastomotic site.  Tattoo was confirmed within the sigmoid   colon as well.    We then dissected out the left abdominal side wall to the splenic flexure.    Freeing up descending colon, we dissected the retroperitoneal attachments of   this as well.  We then dissected towards the pelvis, first freeing up the   posterior presacral space and then taking down the left and right lateral   attachments and opening the anterior peritoneal fold.  This produced good   mobility of the rectum without disturbing the lateral stalks and blood supply.    We then divided the mesentery of the proximal rectum with a vessel sealer   device and divided the rectum itself with 1 sequential green load robotic   staple fire.  With this completed, we gave indocyanine green and used the   Firefly assessment to visualize excellent blood supply to this descending   colon at the planned anastomotic site as well as the rectal stump.    We then made an extraction incision in the left lower quadrant and was carried   down with electrocautery to level of the fascia.  This was incised and rectus   muscles retracted medially.  The posterior sheath was then opened and the   specimen was delivered down into the operative field.  We divided the colon   with a Furnace clamp and a 29 EEA stapler was  loaded into the end of the   bowel.  This was tied down, washed with Betadine and reduced back into the   abdominal cavity.  The wound was closed in layers with running 0 PDS sutures   on posterior and anterior rectus sheath.  Local anesthetic block was given.    Deep tissues were closed with 3-0 Vicryl and skin was then closed with 4-0   Monocryl.    Pneumoperitoneum was re-achieved.  We inserted a 29 EEA stapler into the   patient's rectum, spike was deployed, anvil attached and end-to-end   anastomosis carried out with hemostasis.  We then carried out a leak test and   unfortunately noted an air leak from the anterior staple line of the   anastomosis.    We then laparoscopically placed a 2-0 silk Lembert sutures over the staple   lines.  These were tied down sequentially with the sutures in place.  The   staple line was reinforced.  We re-conducted the insufflation test and noted   no evidence of air leak at this time.  The pelvis was washed with 2 liters of   warm saline.  We then inserted a 19-Albanian round drain into the pelvis through   one of the port sites and this was sutured to skin with a silk.  We then   closed the 12 mm trocar site with an 0 PDS using an Endoclose device.    Pneumoperitoneum was reduced and all trocars were removed.  Skin incisions   were closed with 4-0 Monocryl.  Dermabond was placed in all incisions and the   patient was returned to the PACU in stable condition.  All instruments counts   were correct at the end of the procedure.       ____________________________________     MD GREGORIO Baarjas / ROLANDO    DD:  04/04/2017 15:36:44  DT:  04/04/2017 18:17:38    D#:  [id number]  Job#:  [id number]

## 2017-04-05 NOTE — PROGRESS NOTES
Patient tolerated PO intake, mood pleasant, c/o of minimal pain, no c/o of N/V, patient tolerated medications without difficulty.

## 2017-04-06 LAB
ANION GAP SERPL CALC-SCNC: 8 MMOL/L (ref 0–11.9)
BUN SERPL-MCNC: 20 MG/DL (ref 8–22)
CALCIUM SERPL-MCNC: 8.9 MG/DL (ref 8.5–10.5)
CHLORIDE SERPL-SCNC: 103 MMOL/L (ref 96–112)
CO2 SERPL-SCNC: 27 MMOL/L (ref 20–33)
CREAT SERPL-MCNC: 0.78 MG/DL (ref 0.5–1.4)
ERYTHROCYTE [DISTWIDTH] IN BLOOD BY AUTOMATED COUNT: 45.1 FL (ref 35.9–50)
GFR SERPL CREATININE-BSD FRML MDRD: >60 ML/MIN/1.73 M 2
GLUCOSE SERPL-MCNC: 108 MG/DL (ref 65–99)
HCT VFR BLD AUTO: 40.5 % (ref 42–52)
HGB BLD-MCNC: 13.8 G/DL (ref 14–18)
MCH RBC QN AUTO: 31.4 PG (ref 27–33)
MCHC RBC AUTO-ENTMCNC: 34.1 G/DL (ref 33.7–35.3)
MCV RBC AUTO: 92.3 FL (ref 81.4–97.8)
PLATELET # BLD AUTO: 231 K/UL (ref 164–446)
PMV BLD AUTO: 9.1 FL (ref 9–12.9)
POTASSIUM SERPL-SCNC: 3.4 MMOL/L (ref 3.6–5.5)
RBC # BLD AUTO: 4.39 M/UL (ref 4.7–6.1)
SODIUM SERPL-SCNC: 138 MMOL/L (ref 135–145)
WBC # BLD AUTO: 9.8 K/UL (ref 4.8–10.8)

## 2017-04-06 PROCEDURE — 80048 BASIC METABOLIC PNL TOTAL CA: CPT

## 2017-04-06 PROCEDURE — 85027 COMPLETE CBC AUTOMATED: CPT

## 2017-04-06 PROCEDURE — 700102 HCHG RX REV CODE 250 W/ 637 OVERRIDE(OP): Performed by: SURGERY

## 2017-04-06 PROCEDURE — 36415 COLL VENOUS BLD VENIPUNCTURE: CPT

## 2017-04-06 PROCEDURE — 700111 HCHG RX REV CODE 636 W/ 250 OVERRIDE (IP): Performed by: SURGERY

## 2017-04-06 PROCEDURE — 770006 HCHG ROOM/CARE - MED/SURG/GYN SEMI*

## 2017-04-06 PROCEDURE — A9270 NON-COVERED ITEM OR SERVICE: HCPCS | Performed by: SURGERY

## 2017-04-06 RX ADMIN — ENOXAPARIN SODIUM 40 MG: 100 INJECTION SUBCUTANEOUS at 08:35

## 2017-04-06 RX ADMIN — ALLOPURINOL 300 MG: 300 TABLET ORAL at 08:36

## 2017-04-06 RX ADMIN — ONDANSETRON 4 MG: 2 INJECTION, SOLUTION INTRAMUSCULAR; INTRAVENOUS at 17:20

## 2017-04-06 RX ADMIN — VALSARTAN 160 MG: 80 TABLET ORAL at 08:36

## 2017-04-06 RX ADMIN — AMLODIPINE BESYLATE 5 MG: 10 TABLET ORAL at 08:36

## 2017-04-06 RX ADMIN — TRIAMTERENE AND HYDROCHLOROTHIAZIDE 1 TABLET: 37.5; 25 TABLET ORAL at 08:36

## 2017-04-06 RX ADMIN — KETOROLAC TROMETHAMINE 15 MG: 30 INJECTION, SOLUTION INTRAMUSCULAR; INTRAVENOUS at 12:13

## 2017-04-06 RX ADMIN — ONDANSETRON 4 MG: 2 INJECTION, SOLUTION INTRAMUSCULAR; INTRAVENOUS at 12:13

## 2017-04-06 RX ADMIN — KETOROLAC TROMETHAMINE 15 MG: 30 INJECTION, SOLUTION INTRAMUSCULAR; INTRAVENOUS at 17:20

## 2017-04-06 ASSESSMENT — PAIN SCALES - GENERAL
PAINLEVEL_OUTOF10: 1
PAINLEVEL_OUTOF10: 1
PAINLEVEL_OUTOF10: 0
PAINLEVEL_OUTOF10: 0

## 2017-04-06 NOTE — PROGRESS NOTES
"S:  66 y.o.male s/p Robotic LAR    POD# 2.  Patient is doing well this am.  Passing gas and has had multiple stools.  Tolerating diet.  No N/V.      O:  Blood pressure 159/84, pulse 62, temperature 36.4 °C (97.6 °F), resp. rate 18, height 1.727 m (5' 7.99\"), weight 98.3 kg (216 lb 11.4 oz), SpO2 91 %.  I/O last 3 completed shifts:  In: 2070 [P.O.:1020; I.V.:1050]  Out: 1078 [Urine:950; Drains:128]  Recent Labs      04/05/17   0242  04/06/17   0405   SODIUM  137  138   POTASSIUM  4.0  3.4*   CHLORIDE  103  103   CO2  26  27   GLUCOSE  146*  108*   BUN  20  20   CREATININE  1.01  0.78   CALCIUM  9.2  8.9     Recent Labs      04/05/17   0242  04/06/17   0405   WBC  16.7*  9.8   RBC  4.35*  4.39*   HEMOGLOBIN  13.6*  13.8*   HEMATOCRIT  40.4*  40.5*   MCV  92.9  92.3   MCH  31.3  31.4   MCHC  33.7  34.1   RDW  44.3  45.1   PLATELETCT  246  231   MPV  9.1  9.1       Alert and Oriented x3, No Acute Distress  Normal Respiratory Effort  Abdomen soft, appropriately tender  Incisions/Bandages clean/dry/intact  Extremities warm and well perfused  ALEXANDER Output Serosanguinous 98 ml in 24 hours    A/P:  Pain control po medications   Diet gi soft, tolerating well  Fluids SLIV  Ambulate tid and ad ketty  Shine out this am.    Drain to remain as it is still having output  IS encouraged    "

## 2017-04-06 NOTE — PROGRESS NOTES
Patient encouraged to ambulate. Mood pleasant, patient tolerated meals and medications without difficulty.

## 2017-04-06 NOTE — PROGRESS NOTES
Shift Note:  Assumed care of this pt at 0700  Awake, A&O x4  Dermabond sites to abd, KIRTI  ALEXANDER to Left side, moderate SS drainage, dsg in place with old drainage  Abd pain 0-1/10 per scale  Refused scheduled tylenol and toradol  Advanced to GI soft, tolerating well  Denies nausea, no vomiting  Ambulated several times in lopez  Voided in bathroom independently  IVF discontinued, saline lock  VS WNL, on room air, IS at 3000ml  Call bell within reach, calls appropriately

## 2017-04-06 NOTE — CARE PLAN
Problem: Risk for Impaired Mobility  Goal: Activity Level appropriate for Discharge or Transfer  Outcome: PROGRESSING AS EXPECTED  Patient mobilizes independently. Gait steady. Ambulated in hallways several times.     Problem: Venous Thromboembolism (VTW)/Deep Vein Thrombosis (DVT) Prevention:  Goal: Patient will participate in Venous Thrombosis (VTE)/Deep Vein Thrombosis (DVT)Prevention Measures  Outcome: PROGRESSING AS EXPECTED  lovenox sq given per mar    Problem: Bowel/Gastric:  Goal: Will not experience complications related to bowel motility  Outcome: PROGRESSING AS EXPECTED  BM this am    Problem: Pain Management  Goal: Pain level will decrease to patient’s comfort goal  Outcome: PROGRESSING AS EXPECTED  Pain to minimal level 0-1/10. Scheduled toradol given.

## 2017-04-07 VITALS
OXYGEN SATURATION: 92 % | HEIGHT: 68 IN | WEIGHT: 216.71 LBS | TEMPERATURE: 97.1 F | BODY MASS INDEX: 32.84 KG/M2 | RESPIRATION RATE: 16 BRPM | SYSTOLIC BLOOD PRESSURE: 138 MMHG | HEART RATE: 62 BPM | DIASTOLIC BLOOD PRESSURE: 81 MMHG

## 2017-04-07 LAB
ERYTHROCYTE [DISTWIDTH] IN BLOOD BY AUTOMATED COUNT: 43.2 FL (ref 35.9–50)
HCT VFR BLD AUTO: 40.8 % (ref 42–52)
HGB BLD-MCNC: 14.1 G/DL (ref 14–18)
MCH RBC QN AUTO: 31.7 PG (ref 27–33)
MCHC RBC AUTO-ENTMCNC: 34.6 G/DL (ref 33.7–35.3)
MCV RBC AUTO: 91.7 FL (ref 81.4–97.8)
PLATELET # BLD AUTO: 244 K/UL (ref 164–446)
PMV BLD AUTO: 9.1 FL (ref 9–12.9)
RBC # BLD AUTO: 4.45 M/UL (ref 4.7–6.1)
WBC # BLD AUTO: 9.9 K/UL (ref 4.8–10.8)

## 2017-04-07 PROCEDURE — 700102 HCHG RX REV CODE 250 W/ 637 OVERRIDE(OP): Performed by: SURGERY

## 2017-04-07 PROCEDURE — A9270 NON-COVERED ITEM OR SERVICE: HCPCS | Performed by: SURGERY

## 2017-04-07 PROCEDURE — 700111 HCHG RX REV CODE 636 W/ 250 OVERRIDE (IP): Performed by: SURGERY

## 2017-04-07 PROCEDURE — 36415 COLL VENOUS BLD VENIPUNCTURE: CPT

## 2017-04-07 PROCEDURE — 85027 COMPLETE CBC AUTOMATED: CPT

## 2017-04-07 RX ORDER — OXYCODONE HYDROCHLORIDE AND ACETAMINOPHEN 5; 325 MG/1; MG/1
1-2 TABLET ORAL EVERY 4 HOURS PRN
Qty: 40 TAB | Refills: 0 | Status: SHIPPED | OUTPATIENT
Start: 2017-04-07 | End: 2019-08-02

## 2017-04-07 RX ADMIN — ALLOPURINOL 300 MG: 300 TABLET ORAL at 07:40

## 2017-04-07 RX ADMIN — ENOXAPARIN SODIUM 40 MG: 100 INJECTION SUBCUTANEOUS at 07:40

## 2017-04-07 RX ADMIN — VALSARTAN 160 MG: 80 TABLET ORAL at 07:39

## 2017-04-07 RX ADMIN — AMLODIPINE BESYLATE 5 MG: 10 TABLET ORAL at 07:39

## 2017-04-07 RX ADMIN — TRIAMTERENE AND HYDROCHLOROTHIAZIDE 1 TABLET: 37.5; 25 TABLET ORAL at 07:39

## 2017-04-07 ASSESSMENT — PAIN SCALES - GENERAL: PAINLEVEL_OUTOF10: 1

## 2017-04-07 NOTE — DISCHARGE SUMMARY
Discharge Summary      DATE OF ADMISSION: 4/4/2017    DATE OF DISCHARGE: 4/7/2017    ADMISSION DIAGNOSIS (ES):  ? COLON POLYP    DISCHARGE DIAGNOSIS (ES):  ? SAME    DISCHARGE CONDITION:  ? STABLE    CONSULTATIONS:  ? NONE    PROCEDURES:  Robotic low anterior resection, Firefly assessment of bowel    vasculature.    BRIEF HPI:  This is a 66-year-old male, who presents with a    mass found in his sigmoid colon at 30 cm on recent endoscopic exam.  Mass was    tattooed and too large to be removed endoscopically.  The patient was referred   for elective surgery.    HOSPITAL COURSE:  ? Pt progressing as expected, tolerating diet.  Pain controlled and no C/O N/V.  Bowel function has returned.  ALEXANDER drain still draining serous fluid, will remain in until F/U appointment.           MEDS:   No current outpatient prescriptions on file.       FOLLOW-UP:  ? Please call my office at [phone] to make an appointment in 1 weeks    DISCHARGE INSTRUCTIONS:  May shower  No lifting >20 pounds  Drain to remain in place, record output daily

## 2017-04-07 NOTE — DISCHARGE INSTRUCTIONS
D/c IV  PT MAY SHOWER  NO LIFTING >20 LBS  LEAVE DRAIN IN PLACE    Discharge Instructions    Discharged to home by car with relative. Discharged via wheelchair, hospital escort: Yes.  Special equipment needed: Not Applicable    Be sure to schedule a follow-up appointment with your primary care doctor or any specialists as instructed.     Discharge Plan:   Diet Plan: Discussed  Activity Level: Discussed  Confirmed Follow up Appointment: Patient to Call and Schedule Appointment  Confirmed Symptoms Management: Discussed  Medication Reconciliation Updated: Yes  Influenza Vaccine Indication: Not indicated: Previously immunized this influenza season and > 8 years of age    I understand that a diet low in cholesterol, fat, and sodium is recommended for good health. Unless I have been given specific instructions below for another diet, I accept this instruction as my diet prescription.   Other diet: Regular    Special Instructions: None    · Is patient discharged on Warfarin / Coumadin?   No     · Is patient Post Blood Transfusion?  No    Depression / Suicide Risk    As you are discharged from this [facility] facility, it is important to learn how to keep safe from harming yourself.    Recognize the warning signs:  · Abrupt changes in personality, positive or negative- including increase in energy   · Giving away possessions  · Change in eating patterns- significant weight changes-  positive or negative  · Change in sleeping patterns- unable to sleep or sleeping all the time   · Unwillingness or inability to communicate  · Depression  · Unusual sadness, discouragement and loneliness  · Talk of wanting to die  · Neglect of personal appearance   · Rebelliousness- reckless behavior  · Withdrawal from people/activities they love  · Confusion- inability to concentrate     If you or a loved one observes any of these behaviors or has concerns about self-harm, here's what you can do:  · Talk about it- your feelings and reasons  for harming yourself  · Remove any means that you might use to hurt yourself (examples: pills, rope, extension cords, firearm)  · Get professional help from the community (Mental Health, Substance Abuse, psychological counseling)  · Do not be alone:Call your Safe Contact- someone whom you trust who will be there for you.  · Call your local CRISIS HOTLINE 970-5624 or 521-595-6964  · Call your local Children's Mobile Crisis Response Team [place] (483) 182-0391 or www.[employer]  · Call the toll free National Suicide Prevention Hotlines   · National Suicide Prevention Lifeline 662-682-PHMC (7896)  · National Hope Line Network 800-SUICIDE (396-7084)

## 2017-04-07 NOTE — PROGRESS NOTES
"Shift note:  Assumed care of pt at 0700  Awake, A&O x4  Pain to abd 0-1/10  Medicated per MAR  Ambulating in halls independently, gait steady  ALEXANDER to left abd in place, draining small amount SS  Abd incision dermabond, KIRTI - dry  IV saline lock, on room air, BP in the high normal, bp meds given per MAR  Multiple stools this shift, c/o \"nausea\", given zofran twice this shift. No vomiting  Able to tolerate reg diet otherwise  Plan for discharge in the am per Dr Chan    "

## 2017-04-07 NOTE — PROGRESS NOTES
Patient ambulated without assistance to and from bathroom and hallway. Patient education on pain control. Patient has no c/o of pain, N/V. Mood pleasant, A&O4's, per the patient expected discharge 4/7/2017.

## 2017-04-07 NOTE — PROGRESS NOTES
Reviewed discharge instructions with pt. Provided pt with negrita documentation for ALEXANDER drain care. Educated pt on how to properly drain and manage his ALEXANDER drain. Pt demonstrated to this RN competency on how to manage his ALEXANDER. Provided pt with prescription. PIV discontinued. Provided pt with measuring container for ALEXANDER and dressing supplies. All questions answered regarding discharge. Pt feels safe being discharged.

## 2017-04-07 NOTE — PROGRESS NOTES
Pt A&OX4. VSS. SCD's implemented. Pt denies chest pain. Pt c/o shortness of breath with ambulation, pt reached 3000 on his IS. Pt denies numbness/tingling. Pt tolerating GI soft diet with no c/o nausea/vomiting. Pt ambulating independently with steady gait. Abdominal lap sites noted with KIRTI stauffer. No drainage or sign of infection noted. R ALEXANDER drain in place. Dressing changed at  site at this time. Pt voiding and +BM 4/7. Pt to possibly discharge today. Pt denies pain, continue to monitor pain level and provide intervention as needed. Plan of care reviewed. Bed in lowest position. Call light within reach. Continue to monitor.

## 2017-04-11 DIAGNOSIS — I10 ESSENTIAL HYPERTENSION: ICD-10-CM

## 2017-04-11 DIAGNOSIS — E78.00 PURE HYPERCHOLESTEROLEMIA: ICD-10-CM

## 2017-04-13 ENCOUNTER — HOSPITAL ENCOUNTER (OUTPATIENT)
Dept: LAB | Facility: MEDICAL CENTER | Age: 67
End: 2017-04-13
Attending: INTERNAL MEDICINE
Payer: MEDICARE

## 2017-04-13 DIAGNOSIS — E78.00 PURE HYPERCHOLESTEROLEMIA: ICD-10-CM

## 2017-04-13 DIAGNOSIS — I10 ESSENTIAL HYPERTENSION: ICD-10-CM

## 2017-04-13 LAB
ANION GAP SERPL CALC-SCNC: 17 MMOL/L (ref 0–11.9)
BUN SERPL-MCNC: 15 MG/DL (ref 8–22)
CALCIUM SERPL-MCNC: 9.1 MG/DL (ref 8.5–10.5)
CHLORIDE SERPL-SCNC: 95 MMOL/L (ref 96–112)
CHOLEST SERPL-MCNC: 136 MG/DL (ref 100–199)
CO2 SERPL-SCNC: 24 MMOL/L (ref 20–33)
CREAT SERPL-MCNC: 0.75 MG/DL (ref 0.5–1.4)
GFR SERPL CREATININE-BSD FRML MDRD: >60 ML/MIN/1.73 M 2
GLUCOSE SERPL-MCNC: 85 MG/DL (ref 65–99)
HDLC SERPL-MCNC: 39 MG/DL
LDLC SERPL CALC-MCNC: 79 MG/DL
POTASSIUM SERPL-SCNC: 3.9 MMOL/L (ref 3.6–5.5)
SODIUM SERPL-SCNC: 136 MMOL/L (ref 135–145)
TRIGL SERPL-MCNC: 88 MG/DL (ref 0–149)

## 2017-04-13 PROCEDURE — 36415 COLL VENOUS BLD VENIPUNCTURE: CPT

## 2017-04-13 PROCEDURE — 80061 LIPID PANEL: CPT

## 2017-04-13 PROCEDURE — 80048 BASIC METABOLIC PNL TOTAL CA: CPT

## 2017-04-17 ENCOUNTER — OFFICE VISIT (OUTPATIENT)
Dept: MEDICAL GROUP | Facility: MEDICAL CENTER | Age: 67
End: 2017-04-17
Payer: MEDICARE

## 2017-04-17 VITALS
TEMPERATURE: 96.3 F | OXYGEN SATURATION: 91 % | WEIGHT: 212 LBS | DIASTOLIC BLOOD PRESSURE: 66 MMHG | HEIGHT: 67 IN | RESPIRATION RATE: 16 BRPM | SYSTOLIC BLOOD PRESSURE: 100 MMHG | BODY MASS INDEX: 33.27 KG/M2 | HEART RATE: 96 BPM

## 2017-04-17 DIAGNOSIS — Z00.00 ROUTINE GENERAL MEDICAL EXAMINATION AT A HEALTH CARE FACILITY: ICD-10-CM

## 2017-04-17 DIAGNOSIS — R73.9 HYPERGLYCEMIA: ICD-10-CM

## 2017-04-17 DIAGNOSIS — E66.9 OBESITY (BMI 30.0-34.9): ICD-10-CM

## 2017-04-17 DIAGNOSIS — I10 ESSENTIAL HYPERTENSION: ICD-10-CM

## 2017-04-17 DIAGNOSIS — D12.5 BENIGN NEOPLASM OF SIGMOID COLON: ICD-10-CM

## 2017-04-17 DIAGNOSIS — E78.00 PURE HYPERCHOLESTEROLEMIA: ICD-10-CM

## 2017-04-17 DIAGNOSIS — M10.00 IDIOPATHIC GOUT, UNSPECIFIED CHRONICITY, UNSPECIFIED SITE: ICD-10-CM

## 2017-04-17 DIAGNOSIS — Z23 NEED FOR SHINGLES VACCINE: ICD-10-CM

## 2017-04-17 PROCEDURE — 1036F TOBACCO NON-USER: CPT | Performed by: INTERNAL MEDICINE

## 2017-04-17 PROCEDURE — 1111F DSCHRG MED/CURRENT MED MERGE: CPT | Performed by: INTERNAL MEDICINE

## 2017-04-17 PROCEDURE — G0438 PPPS, INITIAL VISIT: HCPCS | Performed by: INTERNAL MEDICINE

## 2017-04-17 PROCEDURE — G8432 DEP SCR NOT DOC, RNG: HCPCS | Performed by: INTERNAL MEDICINE

## 2017-04-17 ASSESSMENT — PAIN SCALES - GENERAL: PAINLEVEL: 1=MINIMAL PAIN

## 2017-04-17 NOTE — ASSESSMENT & PLAN NOTE
He had a large benign polyp in his sigmoid colon that was resected about a month ago. He is recuperating satisfactorily.

## 2017-04-17 NOTE — ASSESSMENT & PLAN NOTE
He remains significantly overweight with BMI 32.71 but he has lost 6 pounds since December. He was further encouraged weight loss and exercise program.

## 2017-04-17 NOTE — MR AVS SNAPSHOT
"        Giovanny Pack   2017 9:40 AM   Office Visit   MRN: [id number]    Department:  [address] [place] [address]   Dept Phone:  [phone]    Description:  Male : 1950   Provider:  Darrius Magaña M.D.; HEALTH            Reason for Visit     Annual Wellness Visit           Allergies as of 2017     No Known Allergies      You were diagnosed with     Need for shingles vaccine   [073451]       Pure hypercholesterolemia   [272.0.ICD-9-CM]       Essential hypertension   [3163861]       Benign neoplasm of sigmoid colon   [529531]       Idiopathic gout, unspecified chronicity, unspecified site   [4117357]       Obesity (BMI 30.0-34.9)   [262809]       Routine general medical examination at a health care facility   [V70.0.ICD-9-CM]         Vital Signs     Blood Pressure Pulse Temperature Respirations Height Weight    100/66 mmHg 96 35.7 °C (96.3 °F) 16 1.714 m (5' 7.48\") 96.163 kg (212 lb)    Body Mass Index Oxygen Saturation Smoking Status             32.73 kg/m2 91% Former Smoker         Basic Information     Date Of Birth Sex Race Ethnicity Preferred Language    1950 Male White Non- English      Your appointments     Oct 17, 2017  9:40 AM   Established Patient with Darrius Magaña M.D.   [address] ([address])    [address] [place] [id number]   [phone]           You will be receiving a confirmation call a few days before your appointment from our automated call confirmation system.              Problem List              ICD-10-CM Priority Class Noted - Resolved    Gout M10.9   2015 - Present    Obesity (BMI 30.0-34.9) E66.9   2015 - Present    Essential hypertension I10   2015 - Present    Hyperglycemia R73.9   2016 - Present    Onychomycosis B35.1   2016 - Present    Routine general medical examination at a health care facility Z00.00   2016 - Present    Screening cholesterol level Z13.220   2016 - Present "    Need for Streptococcus pneumoniae vaccination Z23   12/27/2016 - Present    Pure hypercholesterolemia E78.00   12/27/2016 - Present    Benign neoplasm of colon D12.6   4/4/2017 - Present      Health Maintenance        Date Due Completion Dates    IMM ZOSTER VACCINE 4/28/2011 ---    IMM PNEUMOCOCCAL 65+ (ADULT) LOW/MEDIUM RISK SERIES (2 of 2 - PPSV23) 12/27/2017 12/27/2016    COLONOSCOPY 2/20/2018 2/20/2017    IMM DTaP/Tdap/Td Vaccine (2 - Td) 8/10/2022 8/10/2012            Current Immunizations     13-VALENT PCV PREVNAR 12/27/2016 10:00 AM    Influenza Vaccine Adult HD 10/31/2016    Tdap Vaccine 8/10/2012      Below and/or attached are the medications your provider expects you to take. Review all of your home medications and newly ordered medications with your provider and/or pharmacist. Follow medication instructions as directed by your provider and/or pharmacist. Please keep your medication list with you and share with your provider. Update the information when medications are discontinued, doses are changed, or new medications (including over-the-counter products) are added; and carry medication information at all times in the event of emergency situations     Allergies:  No Known Allergies          Medications  Valid as of: April 17, 2017 - 11:15 AM    Generic Name Brand Name Tablet Size Instructions for use    Allopurinol (Tab) ZYLOPRIM 300 MG TAKE 1 TABLET BY MOUTH DAILY        AmLODIPine Besylate (Tab) NORVASC 5 MG TAKE 1 TABLET BY MOUTH DAILY        Oxycodone-Acetaminophen (Tab) PERCOCET 5-325 MG Take 1-2 Tabs by mouth every four hours as needed (PAIN).        Triamterene-HCTZ (Tab) MAXZIDE-25/DYAZIDE 37.5-25 MG TAKE 1 TABLET BY MOUTH DAILY        Valsartan (Tab) DIOVAN 160 MG TAKE 1 TAB BY MOUTH EVERY DAY. INDICATIONS: HIGH BLOOD PRESSURE        .                 Medicines prescribed today were sent to:     [facility]ACY #[id number] - [place], [place] - [address]    [address] [place] [address]    Phone:  [phone] Fax: [phone]    Open 24 Hours?: No      Medication refill instructions:       If your prescription bottle indicates you have medication refills left, it is not necessary to call your provider’s office. Please contact your pharmacy and they will refill your medication.    If your prescription bottle indicates you do not have any refills left, you may request refills at any time through one of the following ways: The online [facility] system (except Urgent Care), by calling your provider’s office, or by asking your pharmacy to contact your provider’s office with a refill request. Medication refills are processed only during regular business hours and may not be available until the next business day. Your provider may request additional information or to have a follow-up visit with you prior to refilling your medication.   *Please Note: Medication refills are assigned a new Rx number when refilled electronically. Your pharmacy may indicate that no refills were authorized even though a new prescription for the same medication is available at the pharmacy. Please request the medicine by name with the pharmacy before contacting your provider for a refill.        Your To Do List     Future Labs/Procedures Complete By Expires    BASIC METABOLIC PANEL  As directed 4/18/2018    LIPID PROFILE  As directed 4/18/2018         [facility] Access Code: Activation code not generated  Current [facility] Status: Active

## 2017-04-17 NOTE — ASSESSMENT & PLAN NOTE
Blood sugar has been elevated previously but currently is normal at 85. He has lost some weight related to the surgery.

## 2017-04-17 NOTE — ASSESSMENT & PLAN NOTE
Most recent cholesterol was 136 with triglycerides 88, HDL 39, LDL 79. He has not been exercising much recently but anticipates getting more active soon. We reviewed appropriate diet.

## 2017-04-17 NOTE — ASSESSMENT & PLAN NOTE
Blood pressure is quite satisfactory. He continues taking valsartan and amlodipine and Dyazide without difficulty. He denies lightheadedness.

## 2017-04-17 NOTE — PROGRESS NOTES
Chief Complaint   Patient presents with   • Annual Wellness Visit         HPI:  Giovanny is a 66 y.o. male here for Medicare Annual Wellness Visit and follow-up of his various health problems.    Patient Active Problem List    Diagnosis Date Noted   • Benign neoplasm of colon 04/04/2017   • Need for Streptococcus pneumoniae vaccination 12/27/2016   • Pure hypercholesterolemia 12/27/2016   • Routine general medical examination at a health care facility 06/27/2016   • Screening cholesterol level 06/27/2016   • Hyperglycemia 02/29/2016   • Onychomycosis 02/29/2016   • Gout 11/18/2015   • Obesity (BMI 30.0-34.9) 11/18/2015   • Essential hypertension 11/18/2015       Current Outpatient Prescriptions   Medication Sig Dispense Refill   • valsartan (DIOVAN) 160 MG Tab TAKE 1 TAB BY MOUTH EVERY DAY. INDICATIONS: HIGH BLOOD PRESSURE 30 Tab 11   • amlodipine (NORVASC) 5 MG Tab TAKE 1 TABLET BY MOUTH DAILY 90 Tab 3   • allopurinol (ZYLOPRIM) 300 MG Tab TAKE 1 TABLET BY MOUTH DAILY 30 Tab 11   • triamterene-hctz (MAXZIDE-25/DYAZIDE) 37.5-25 MG Tab TAKE 1 TABLET BY MOUTH DAILY 90 Tab 3   • oxycodone-acetaminophen (PERCOCET) 5-325 MG Tab Take 1-2 Tabs by mouth every four hours as needed (PAIN). (Patient not taking: Reported on 4/12/2017) 40 Tab 0     No current facility-administered medications for this visit.        Patient is taking medications as noted in medication list.  Current supplements as per medication list.   Chronic narcotic pain medicines: no    Allergies: Review of patient's allergies indicates no known allergies.    Current social contact/activities: visit with family and friends, usually walking 4-5x per wk for 2.5 hrs recently had bowel resection surgery and is taking a break for recovery.       Is patient current with immunizations?  No, due for ZOSTAVAX (Shingles). Patient is interested in receiving ZOSTAVAX (Shingles) today but we do not have any.    He  reports that he quit smoking about 20 years ago. His  smoking use included Cigarettes. He has a 62.5 pack-year smoking history. He has never used smokeless tobacco. He reports that he drinks about 18.0 - 21.0 oz of alcohol per week. He reports that he does not use illicit drugs.  Counseling given: Yes        DPA/Advanced Directive:  Patient does not have an Advanced Directive.  A packet and workshop information was given on Advanced Directives.    ROS:    Gait: Uses no assistive device   Ostomy: no   Other tubes: no   Amputations: no   Chronic oxygen use no   Last eye exam 6-7 months ago   Wears hearing aids: no   : Denies incontinence. He denies recent headaches or lightheadedness or change in vision or hearing or swallowing. He denies significant dyspnea or chest pain or palpitations or indigestion or change in bowel habits except related to the surgery right now he has frequent quite soft bowel movements. He denies recent change in urinating. He denies significant ankle swelling.    Depression Screening    Little interest or pleasure in doing things?  0 - not at all  Feeling down, depressed, or hopeless?   0  Patient Health Questionnaire Score:  0  If depressive symptoms identified deferred to follow up visit unless specifically addressed in assessment and plan.    Screening for Cognitive Impairment    Three Minute Recall (banana, sunrise, fence)  3/3    Draw clock face with all 12 numbers set to the hand to show 10 minutes past 11 o'clock  1 5/5  If cognitive concerns identified deferred to follow up visit unless specifically addressed in assessment and plan.    Fall Risk Assessment    Has the patient had two or more falls in the last year or any fall with injury in the last year?  No  If Fall Risk identified deferred to follow up visit unless specifically addressed in assessment and plan.    Safety Assessment    Throw rugs on floor.  No  Handrails on all stairs.  Yes  Good lighting in all hallways.  Yes  Difficulty hearing.  No  Patient counseled about all  safety risks that were identified.    Functional Assessment ADLs    Are there any barriers preventing you from cooking for yourself or meeting nutritional needs?  No.    Are there any barriers preventing you from driving safely or obtaining transportation?  No.    Are there any barriers preventing you from using a telephone or calling for help?  No.    Are there any barriers preventing you from shopping?  No.    Are there any barriers preventing you from taking care of your own finances?  No.    Are there any barriers preventing you from managing your medications?  No.    Are currently engaging any exercise or physical activity?  Yes.       Health Maintenance Summary                Annual Wellness Visit Overdue 1950     IMM ZOSTER VACCINE Overdue 4/28/2011     IMM PNEUMOCOCCAL 65+ (ADULT) LOW/MEDIUM RISK SERIES Next Due 12/27/2017      Done 12/27/2016 Imm Admin: Pneumococcal Conjugate Vaccine (Prevnar/PCV-13)    COLONOSCOPY Next Due 2/20/2018      Done 2/20/2017 REFERRAL TO GI FOR COLONOSCOPY    IMM DTaP/Tdap/Td Vaccine Next Due 8/10/2022      Done 8/10/2012 Imm Admin: Tdap Vaccine          Patient Care Team:  Darrius Magaña M.D. as PCP - General  Toi Sparks O.D. as Consulting Physician (Optometry)    Social History   Substance Use Topics   • Smoking status: Former Smoker -- 2.50 packs/day for 25 years     Types: Cigarettes     Quit date: 05/18/1996   • Smokeless tobacco: Never Used      Comment: Started smoking at age 21 smoked off and on for 45 years   • Alcohol Use: 18.0 - 21.0 oz/week     30-35 Glasses of wine per week     Family History   Problem Relation Age of Onset   • Hypertension Mother      HO   • No Known Problems Sister    • No Known Problems Sister    • Diabetes Maternal Grandmother    • No Known Problems Maternal Grandfather    • Dementia Paternal Grandmother    • No Known Problems Father      He  has a past medical history of Gout (11/18/2015); Hyperglycemia (2/29/2016);  "Onychomycosis (2/29/2016); Routine general medical examination at a health care facility (6/27/2016); Hepatitis A; Jaundice; Hypertension; Dental disorder; and Colon neoplasm.   Past Surgical History   Procedure Laterality Date   • Other abdominal surgery       hernia   • Rhinoplasty     • Low anterior resection robotic xi  4/4/2017     Procedure: LOW ANTERIOR RESECTION ROBOTIC XI;  Surgeon: Grayson Chan M.D.;  Location: SURGERY [facility];  Service:            Exam:     Blood pressure 100/66, pulse 96, temperature 35.7 °C (96.3 °F), resp. rate 16, height 1.714 m (5' 7.48\"), weight 96.163 kg (212 lb), SpO2 91 %. Body mass index is 32.73 kg/(m^2).    Hearing excellent.    Dentition lower dentures  Alert, oriented in no acute distress.  Eye contact is good, speech goal directed, affect calm. Ears are clear, pupils are equal, round, and reactive to light. Normal extraocular movements. Pharynx is unremarkable. Neck is supple and without lymphadenopathy. Lungs are clear without rales or wheeze. Cardiovascular peripheral circulation is satisfactory. Heart sounds are unremarkable. Abdomen is soft without masses or tenderness. There are normal bowel sounds. Surgical incisions appear to be healing appropriately. Rectal and genital examinations were not performed at this time. Extremities were without significant edema, cyanosis, or deformity neurologic exam was unremarkable with essentially normal sensation, strength, gait, and cerebellar functions.      Assessment and Plan. The following treatment and monitoring plan is recommended:      Benign neoplasm of colon  He had a large benign polyp in his sigmoid colon that was resected about a month ago. He is recuperating satisfactorily.    Essential hypertension  Blood pressure is quite satisfactory. He continues taking valsartan and amlodipine and Dyazide without difficulty. He denies lightheadedness.    Gout  He continues allopurinol. He has had no recent gout " attacks.    Hyperglycemia  Blood sugar has been elevated previously but currently is normal at 85. He has lost some weight related to the surgery.    Obesity (BMI 30.0-34.9)  He remains significantly overweight with BMI 32.71 but he has lost 6 pounds since December. He was further encouraged weight loss and exercise program.    Pure hypercholesterolemia  Most recent cholesterol was 136 with triglycerides 88, HDL 39, LDL 79. He has not been exercising much recently but anticipates getting more active soon. We reviewed appropriate diet.            Services suggested: No services needed at this time  Health Care Screening recommendations as per orders if indicated.  Referrals offered: PT/OT/Nutrition counseling/Behavioral Health/Smoking cessation as per orders if indicated.    Discussion today about general wellness and lifestyle habits:    · Prevent falls and reduce trip hazards; Cautioned about securing or removing rugs.  · Have a working fire alarm and carbon monoxide detector;   · Engage in regular physical activity and social activities       Follow-up: If he is doing well we will not need to see him for about 6 months or of course otherwise as needed sooner.

## 2017-04-18 ENCOUNTER — TELEPHONE (OUTPATIENT)
Dept: MEDICAL GROUP | Facility: MEDICAL CENTER | Age: 67
End: 2017-04-18

## 2017-04-18 NOTE — TELEPHONE ENCOUNTER
1. Caller Name: Giovanny                      Call Back Number: [phone] (home)       2. Message: Patient had bowel resection 2 weeks ago and today he is having extreme diarrhea and would like to know if he can take an imodium, he wanted to check with you before he took it.    3. Patient approves office to leave a detailed voicemail/MyChart message: N\A

## 2017-04-19 NOTE — TELEPHONE ENCOUNTER
Patient was advised that he could take an Imodium. If he continues to have significant problems, he should contact Dr. Chan.

## 2017-08-28 RX ORDER — TRIAMTERENE AND HYDROCHLOROTHIAZIDE 37.5; 25 MG/1; MG/1
1 TABLET ORAL DAILY
Qty: 90 TAB | Refills: 3 | Status: SHIPPED | OUTPATIENT
Start: 2017-08-28 | End: 2018-08-24 | Stop reason: SDUPTHER

## 2017-10-13 ENCOUNTER — HOSPITAL ENCOUNTER (OUTPATIENT)
Dept: LAB | Facility: MEDICAL CENTER | Age: 67
End: 2017-10-13
Attending: INTERNAL MEDICINE
Payer: MEDICARE

## 2017-10-13 DIAGNOSIS — I10 ESSENTIAL HYPERTENSION: ICD-10-CM

## 2017-10-13 DIAGNOSIS — E78.00 PURE HYPERCHOLESTEROLEMIA: ICD-10-CM

## 2017-10-13 LAB
ANION GAP SERPL CALC-SCNC: 9 MMOL/L (ref 0–11.9)
BUN SERPL-MCNC: 14 MG/DL (ref 8–22)
CALCIUM SERPL-MCNC: 9.7 MG/DL (ref 8.5–10.5)
CHLORIDE SERPL-SCNC: 100 MMOL/L (ref 96–112)
CHOLEST SERPL-MCNC: 169 MG/DL (ref 100–199)
CO2 SERPL-SCNC: 29 MMOL/L (ref 20–33)
CREAT SERPL-MCNC: 0.95 MG/DL (ref 0.5–1.4)
GFR SERPL CREATININE-BSD FRML MDRD: >60 ML/MIN/1.73 M 2
GLUCOSE SERPL-MCNC: 108 MG/DL (ref 65–99)
HDLC SERPL-MCNC: 60 MG/DL
LDLC SERPL CALC-MCNC: 91 MG/DL
POTASSIUM SERPL-SCNC: 4.4 MMOL/L (ref 3.6–5.5)
SODIUM SERPL-SCNC: 138 MMOL/L (ref 135–145)
TRIGL SERPL-MCNC: 89 MG/DL (ref 0–149)

## 2017-10-13 PROCEDURE — 80061 LIPID PANEL: CPT

## 2017-10-13 PROCEDURE — 36415 COLL VENOUS BLD VENIPUNCTURE: CPT

## 2017-10-13 PROCEDURE — 80048 BASIC METABOLIC PNL TOTAL CA: CPT

## 2017-10-17 ENCOUNTER — OFFICE VISIT (OUTPATIENT)
Dept: MEDICAL GROUP | Facility: MEDICAL CENTER | Age: 67
End: 2017-10-17
Payer: MEDICARE

## 2017-10-17 VITALS
HEIGHT: 67 IN | DIASTOLIC BLOOD PRESSURE: 60 MMHG | WEIGHT: 215 LBS | RESPIRATION RATE: 16 BRPM | OXYGEN SATURATION: 95 % | HEART RATE: 80 BPM | TEMPERATURE: 98.4 F | BODY MASS INDEX: 33.74 KG/M2 | SYSTOLIC BLOOD PRESSURE: 122 MMHG

## 2017-10-17 DIAGNOSIS — Z23 FLU VACCINE NEED: ICD-10-CM

## 2017-10-17 DIAGNOSIS — R73.9 HYPERGLYCEMIA: ICD-10-CM

## 2017-10-17 DIAGNOSIS — M10.00 IDIOPATHIC GOUT, UNSPECIFIED CHRONICITY, UNSPECIFIED SITE: ICD-10-CM

## 2017-10-17 DIAGNOSIS — E66.9 OBESITY (BMI 30.0-34.9): ICD-10-CM

## 2017-10-17 DIAGNOSIS — E78.00 PURE HYPERCHOLESTEROLEMIA: ICD-10-CM

## 2017-10-17 DIAGNOSIS — I10 ESSENTIAL HYPERTENSION: ICD-10-CM

## 2017-10-17 PROCEDURE — 90662 IIV NO PRSV INCREASED AG IM: CPT | Performed by: INTERNAL MEDICINE

## 2017-10-17 PROCEDURE — G0008 ADMIN INFLUENZA VIRUS VAC: HCPCS | Performed by: INTERNAL MEDICINE

## 2017-10-17 PROCEDURE — 99214 OFFICE O/P EST MOD 30 MIN: CPT | Mod: 25 | Performed by: INTERNAL MEDICINE

## 2017-10-17 NOTE — ASSESSMENT & PLAN NOTE
Lipids show that his cholesterol is 169, triglycerides 89, HDL 60, LDL 91. He remains significantly overweight. He is not really careful about his diet.

## 2017-10-17 NOTE — ASSESSMENT & PLAN NOTE
Blood sugar is a little high again at 108. He does eat some sweets. He is not particularly careful about his diet. He remains overweight.

## 2017-10-17 NOTE — ASSESSMENT & PLAN NOTE
He remains significantly overweight with BMI 33.67. He is not in much of an exercise program though he does stay fairly active. He is not very careful about his diet.

## 2017-10-17 NOTE — ASSESSMENT & PLAN NOTE
He continues valsartan, amlodipine, and Dyazide. He denies significant lightheadedness. Blood pressure is satisfactory.

## 2017-10-17 NOTE — PROGRESS NOTES
Subjective:     Chief Complaint   Patient presents with   • Follow-Up     Giovanny Pack is a 67 y.o. male here today forFollow-up of his various health problems.    Pure hypercholesterolemia  Lipids show that his cholesterol is 169, triglycerides 89, HDL 60, LDL 91. He remains significantly overweight. He is not really careful about his diet.    Obesity (BMI 30.0-34.9)  He remains significantly overweight with BMI 33.67. He is not in much of an exercise program though he does stay fairly active. He is not very careful about his diet.    Hyperglycemia  Blood sugar is a little high again at 108. He does eat some sweets. He is not particularly careful about his diet. He remains overweight.    Gout  He has a history of gout for which he takes allopurinol. He has had no recent gout attacks. Uric acid level last year was 5.7.    Flu vaccine need  He has not had flu vaccine yet this year and will get that done today.    Essential hypertension  He continues valsartan, amlodipine, and Dyazide. He denies significant lightheadedness. Blood pressure is satisfactory.       Diagnoses of Flu vaccine need, Essential hypertension, Hyperglycemia, Pure hypercholesterolemia, Obesity (BMI 30.0-34.9), and Idiopathic gout, unspecified chronicity, unspecified site were pertinent to this visit.    Allergies: Review of patient's allergies indicates no known allergies.  Current medicines (including changes today)  Current Outpatient Prescriptions   Medication Sig Dispense Refill   • valsartan (DIOVAN) 160 MG Tab TAKE 1 TAB BY MOUTH EVERY DAY. INDICATIONS: HIGH BLOOD PRESSURE 30 Tab 11   • amlodipine (NORVASC) 5 MG Tab TAKE 1 TABLET BY MOUTH DAILY 90 Tab 3   • allopurinol (ZYLOPRIM) 300 MG Tab TAKE 1 TABLET BY MOUTH DAILY 30 Tab 11   • triamterene-hctz (MAXZIDE-25/DYAZIDE) 37.5-25 MG Tab Take 1 Tab by mouth every day. 90 Tab 3   • oxycodone-acetaminophen (PERCOCET) 5-325 MG Tab Take 1-2 Tabs by mouth every four hours as needed (PAIN).  "(Patient not taking: Reported on 4/12/2017) 40 Tab 0     No current facility-administered medications for this visit.        He  has a past medical history of Colon neoplasm; Dental disorder; Gout (11/18/2015); Hepatitis A; Hyperglycemia (2/29/2016); Hypertension; Jaundice; Onychomycosis (2/29/2016); and Routine general medical examination at a health care facility (6/27/2016).  Health Maintenance:He will get flu vaccine today. He will get the new shingles vaccine in the next few months.  ROS    Patient denies significant change in strength, weight or appetite.  No significant lightheadedness or headaches.  No change in vision, hearing, or swallowing.  No new dyspnea, coughing, chest pain, or palpitations.  No indigestion, abdominal pain, or change in bowel habits.  No change in urinating.  No new ankle swelling.       Objective:     PE:  /60   Pulse 80   Temp 36.9 °C (98.4 °F)   Resp 16   Ht 1.702 m (5' 7\")   Wt 97.5 kg (215 lb)   SpO2 95%   BMI 33.67 kg/m²    Neck is supple without significant lymphadenopathy or masses.  Lungs are clear with normal breath sounds without wheezes or rales .  Cardiovascular: peripheral circulation is satisfactory, heart sounds are unchanged and unremarkable.  Abdomen is soft, without masses or tenderness, with normal bowel sounds.  Extremities are without significant edema, cyanosis or deformity.      Assessment and Plan:   The following treatment plan was discussed  1. Flu vaccine need  INFLUENZA VACCINE, HIGH DOSE (65+ ONLY)    He will receive flu vaccine today.   2. Essential hypertension  BASIC METABOLIC PANEL    No medication change. We reviewed low-salt diet and weight loss.   3. Hyperglycemia      He will avoid concentrated sweets and was encouraged in exercise and weight loss program.   4. Pure hypercholesterolemia  LIPID PROFILE    We again reviewed appropriate diet.   5. Obesity (BMI 30.0-34.9)      We discussed importance of weight loss including appropriate " diet and exercise.   6. Idiopathic gout, unspecified chronicity, unspecified site      Continue allopurinol. He will report any gout attacks.       Followup: Return in about 6 months (around 4/17/2018), or wellness, for Long.

## 2017-10-17 NOTE — ASSESSMENT & PLAN NOTE
He has a history of gout for which he takes allopurinol. He has had no recent gout attacks. Uric acid level last year was 5.7.

## 2017-10-26 RX ORDER — ALLOPURINOL 300 MG/1
TABLET ORAL
Qty: 30 TAB | Refills: 11 | Status: SHIPPED | OUTPATIENT
Start: 2017-10-26 | End: 2018-08-24 | Stop reason: SDUPTHER

## 2017-11-27 RX ORDER — AMLODIPINE BESYLATE 5 MG/1
TABLET ORAL
Qty: 90 TAB | Refills: 3 | Status: SHIPPED | OUTPATIENT
Start: 2017-11-27 | End: 2018-03-30

## 2017-12-21 RX ORDER — VALSARTAN 160 MG/1
TABLET ORAL
Qty: 90 TAB | Refills: 3 | Status: SHIPPED | OUTPATIENT
Start: 2017-12-21 | End: 2018-12-28 | Stop reason: SDUPTHER

## 2018-08-24 RX ORDER — TRIAMTERENE AND HYDROCHLOROTHIAZIDE 37.5; 25 MG/1; MG/1
1 TABLET ORAL DAILY
Qty: 90 TAB | Refills: 3 | Status: SHIPPED | OUTPATIENT
Start: 2018-08-24 | End: 2019-08-28 | Stop reason: SDUPTHER

## 2018-10-23 ENCOUNTER — PATIENT OUTREACH (OUTPATIENT)
Dept: HEALTH INFORMATION MANAGEMENT | Facility: OTHER | Age: 68
End: 2018-10-23

## 2018-10-23 NOTE — PROGRESS NOTES
Outcome: Left Message    Please transfer to Patient Outreach Team at [phone] when patient returns call.    WebIZ Checked & Epic Updated:  yes    [facility] Verified: yes    Attempt # 1

## 2018-10-29 NOTE — PROGRESS NOTES
Outcome: Left Message    Please transfer to Patient Outreach Team at [phone] when patient returns call.    WebIZ Checked & Epic Updated:  yes    [facility] Verified: no.unable to access    Attempt # 2

## 2018-11-05 NOTE — PROGRESS NOTES
Outcome: Left Message    Please transfer to Patient Outreach Team at [phone] when patient returns call.    WebIZ Checked & Epic Updated:  yes    [facility] Verified: yes    Attempt # 3

## 2018-11-12 NOTE — PROGRESS NOTES
Outcome: Left Message    Please transfer to Patient Outreach Team at [phone] when patient returns call.    WebIZ Checked & Epic Updated:  yes    [facility] Verified: yes    Attempt # 4    4TH AND FINAL ATTEMPT

## 2019-06-27 ENCOUNTER — PATIENT OUTREACH (OUTPATIENT)
Dept: HEALTH INFORMATION MANAGEMENT | Facility: OTHER | Age: 69
End: 2019-06-27

## 2019-06-27 NOTE — PROGRESS NOTES
1. [facility] Verified: yes    2. Verify PCP: yes    3. Review and add  to Care Team: yes    4.Reviewed/Updated the following with patient:       •   Communication Preference Obtained? YES  • MyChart Activation: already active       •   E-Mail Address Obtained? YES       •   Appointment Day and Time Preferences? YES       •   Preferred Pharmacy? YES       •   Preferred Lab? YES    6. Care Gap Scheduling (Attempt to Schedule EACH Overdue Care Gap!)    Scheduled patient for Follow-Up for AHA/ last appt was in 2017.       Called pt and I introduced myself as his SCP . Pt stated that he has no more questions or concerns at this time. Call back number was given. Please transfer at [phone].

## 2019-08-01 ENCOUNTER — TELEPHONE (OUTPATIENT)
Dept: MEDICAL GROUP | Facility: MEDICAL CENTER | Age: 69
End: 2019-08-01

## 2019-08-02 ENCOUNTER — OFFICE VISIT (OUTPATIENT)
Dept: MEDICAL GROUP | Facility: MEDICAL CENTER | Age: 69
End: 2019-08-02
Payer: MEDICARE

## 2019-08-02 VITALS
SYSTOLIC BLOOD PRESSURE: 120 MMHG | DIASTOLIC BLOOD PRESSURE: 74 MMHG | WEIGHT: 218 LBS | HEIGHT: 68 IN | BODY MASS INDEX: 33.04 KG/M2 | TEMPERATURE: 98.2 F | HEART RATE: 69 BPM | OXYGEN SATURATION: 94 %

## 2019-08-02 DIAGNOSIS — M10.00 IDIOPATHIC GOUT, UNSPECIFIED CHRONICITY, UNSPECIFIED SITE: ICD-10-CM

## 2019-08-02 DIAGNOSIS — R73.9 HYPERGLYCEMIA: ICD-10-CM

## 2019-08-02 DIAGNOSIS — I10 ESSENTIAL HYPERTENSION: ICD-10-CM

## 2019-08-02 DIAGNOSIS — D12.5 BENIGN NEOPLASM OF SIGMOID COLON: ICD-10-CM

## 2019-08-02 DIAGNOSIS — E78.00 PURE HYPERCHOLESTEROLEMIA: ICD-10-CM

## 2019-08-02 PROCEDURE — 8041 PR SCP AHA: Performed by: INTERNAL MEDICINE

## 2019-08-02 PROCEDURE — 99214 OFFICE O/P EST MOD 30 MIN: CPT | Performed by: INTERNAL MEDICINE

## 2019-08-02 ASSESSMENT — PATIENT HEALTH QUESTIONNAIRE - PHQ9: CLINICAL INTERPRETATION OF PHQ2 SCORE: 0

## 2019-08-02 NOTE — ASSESSMENT & PLAN NOTE
He has a history of elevated blood sugars.  He does not consume a lot of sweets but he does admit to drinking about 7 glasses of wine per day.

## 2019-08-02 NOTE — ASSESSMENT & PLAN NOTE
Prior sigmoid: Resection for a 3 cm polyp as well as several smaller polyps.  He has not yet had a follow-up colonoscopy and is due for that.

## 2019-08-02 NOTE — ASSESSMENT & PLAN NOTE
He takes allopurinol for gout.  He has not had a recent gout attack.  He has not had a recent uric acid level.

## 2019-08-02 NOTE — ASSESSMENT & PLAN NOTE
He has a history of hyperlipidemia.  He has not had a recent lipid panel.  He is not careful about his diet.  He does get some regular exercise.  He remains significantly overweight.

## 2019-08-02 NOTE — ASSESSMENT & PLAN NOTE
Hypertension is fairly well controlled with amlodipine and valsartan and Maxide.  He denies lightheadedness.  He is not particularly careful about avoiding salt.

## 2019-08-02 NOTE — PROGRESS NOTES
Subjective:     Chief Complaint   Patient presents with   • Follow-Up   • Hypertension     Giovanny Pack is a 69 y.o. male here today for follow-up of hyperglycemia and hypertension and hyperlipidemia and gout and prior colon resection.  We have not seen him in about 2 years.    Pure hypercholesterolemia  He has a history of hyperlipidemia.  He has not had a recent lipid panel.  He is not careful about his diet.  He does get some regular exercise.  He remains significantly overweight.    Hyperglycemia  He has a history of elevated blood sugars.  He does not consume a lot of sweets but he does admit to drinking about 7 glasses of wine per day.    Gout  He takes allopurinol for gout.  He has not had a recent gout attack.  He has not had a recent uric acid level.    Essential hypertension  Hypertension is fairly well controlled with amlodipine and valsartan and Maxide.  He denies lightheadedness.  He is not particularly careful about avoiding salt.    Benign neoplasm of colon  Prior sigmoid: Resection for a 3 cm polyp as well as several smaller polyps.  He has not yet had a follow-up colonoscopy and is due for that.       Diagnoses of Essential hypertension, Idiopathic gout, unspecified chronicity, unspecified site, Hyperglycemia, Pure hypercholesterolemia, and Benign neoplasm of sigmoid colon were pertinent to this visit.    Allergies: Patient has no known allergies.  Current medicines (including changes today)  Current Outpatient Medications   Medication Sig Dispense Refill   • amLODIPine (NORVASC) 5 MG Tab TAKE 1 TABLET BY MOUTH DAILY 90 Tab 3   • valsartan (DIOVAN) 160 MG Tab TAKE 1 TABLET BY MOUTH EVERY DAY FOR BLOOD PRESSURE 90 Tab 3   • allopurinol (ZYLOPRIM) 300 MG Tab TAKE 1 TABLET BY MOUTH DAILY 90 Tab 3   • triamterene-hctz (MAXZIDE-25/DYAZIDE) 37.5-25 MG Tab TAKE 1 TAB BY MOUTH EVERY DAY. 90 Tab 3   • amLODIPine (NORVASC) 5 MG Tab TAKE 1 TABLET BY MOUTH DAILY 90 Tab 3   • oxycodone-acetaminophen  "(PERCOCET) 5-325 MG Tab Take 1-2 Tabs by mouth every four hours as needed (PAIN). (Patient not taking: Reported on 4/12/2017) 40 Tab 0     No current facility-administered medications for this visit.        He  has a past medical history of Colon neoplasm, Dental disorder, Gout (11/18/2015), Hepatitis A, Hyperglycemia (2/29/2016), Hypertension, Jaundice, Onychomycosis (2/29/2016), and Routine general medical examination at a health care facility (6/27/2016).  Health Maintenance: He will bring in his vaccination record.  He will follow-up with his gastroenterologist for overdue colonoscopy.    Juwan Thompson Ass't   Medical Assistant      Progress Notes      Sign at close encounter   Creation Time:  8/2/2019  9:21 AM                  Annual Health Assessment Questions:     1.  Are you currently engaging in any exercise or physical activity? Yes     2.  How would you describe your mood or emotional well-being today? good     3.  Have you had any falls in the last year? No     4.  Have you noticed any problems with your balance or had difficulty walking? No     5.  In the last six months have you experienced any leakage of urine? No     6. DPA/Advanced Directive: Patient does not have an Advanced Directive.  A packet and workshop information was given on Advanced Directives.          ROS    Patient denies significant change in strength, weight or appetite.  No significant lightheadedness or headaches.  No change in vision, hearing, or swallowing.  No new dyspnea, coughing, chest pain, or palpitations.  No indigestion, abdominal pain, or change in bowel habits.  Since he had the sigmoid resection, he notes that bowel habits are little more irregular.  No change in urinating.  No new ankle swelling.       Objective:     PE:  /74 (BP Location: Left arm, Patient Position: Sitting)   Pulse 69   Temp 36.8 °C (98.2 °F)   Ht 1.727 m (5' 8\")   Wt 98.9 kg (218 lb)   SpO2 94%   BMI 33.15 kg/m²    Neck is " supple without significant lymphadenopathy or masses.  Lungs are clear with normal breath sounds without wheezes or rales .  Cardiovascular: peripheral circulation is satisfactory, heart sounds are unchanged and unremarkable.  Abdomen is soft, without masses or tenderness, with normal bowel sounds.  Extremities are without significant edema, cyanosis or deformity.      Assessment and Plan:   The following treatment plan was discussed  1. Essential hypertension  Basic Metabolic Panel    No medication change.  We reviewed low-salt diet as well as importance of weight loss.   2. Idiopathic gout, unspecified chronicity, unspecified site  URIC ACID    Continue allopurinol.  We will check a uric acid level.  We reviewed foods to avoid.   3. Hyperglycemia      He was encouraged to cut back on consumption of wine to no more than 1 glass/day.  We will check a blood sugar.   4. Pure hypercholesterolemia  Lipid Profile    We will check a lipid panel.  We reviewed appropriate diet.   5. Benign neoplasm of sigmoid colon      He was admonished to check with his gastroenterologist to schedule a follow-up colonoscopy.  We reviewed the importance of that.       Followup: Return in about 4 months (around 12/2/2019), or health  wellness, for Long.

## 2019-08-03 ENCOUNTER — HOSPITAL ENCOUNTER (OUTPATIENT)
Dept: LAB | Facility: MEDICAL CENTER | Age: 69
End: 2019-08-03
Attending: INTERNAL MEDICINE
Payer: MEDICARE

## 2019-08-03 DIAGNOSIS — I10 ESSENTIAL HYPERTENSION: ICD-10-CM

## 2019-08-03 DIAGNOSIS — M10.00 IDIOPATHIC GOUT, UNSPECIFIED CHRONICITY, UNSPECIFIED SITE: ICD-10-CM

## 2019-08-03 DIAGNOSIS — E78.00 PURE HYPERCHOLESTEROLEMIA: ICD-10-CM

## 2019-08-03 LAB
ANION GAP SERPL CALC-SCNC: 11 MMOL/L (ref 0–11.9)
BUN SERPL-MCNC: 19 MG/DL (ref 8–22)
CALCIUM SERPL-MCNC: 9.1 MG/DL (ref 8.5–10.5)
CHLORIDE SERPL-SCNC: 102 MMOL/L (ref 96–112)
CHOLEST SERPL-MCNC: 166 MG/DL (ref 100–199)
CO2 SERPL-SCNC: 25 MMOL/L (ref 20–33)
CREAT SERPL-MCNC: 0.97 MG/DL (ref 0.5–1.4)
FASTING STATUS PATIENT QL REPORTED: NORMAL
GLUCOSE SERPL-MCNC: 114 MG/DL (ref 65–99)
HDLC SERPL-MCNC: 60 MG/DL
LDLC SERPL CALC-MCNC: 85 MG/DL
POTASSIUM SERPL-SCNC: 3.9 MMOL/L (ref 3.6–5.5)
SODIUM SERPL-SCNC: 138 MMOL/L (ref 135–145)
TRIGL SERPL-MCNC: 107 MG/DL (ref 0–149)
URATE SERPL-MCNC: 4.7 MG/DL (ref 2.5–8.3)

## 2019-08-03 PROCEDURE — 80061 LIPID PANEL: CPT

## 2019-08-03 PROCEDURE — 36415 COLL VENOUS BLD VENIPUNCTURE: CPT

## 2019-08-03 PROCEDURE — 80048 BASIC METABOLIC PNL TOTAL CA: CPT

## 2019-08-03 PROCEDURE — 84550 ASSAY OF BLOOD/URIC ACID: CPT

## 2019-10-07 RX ORDER — VALSARTAN 160 MG/1
160 TABLET ORAL
Qty: 90 TAB | Refills: 3 | Status: SHIPPED | OUTPATIENT
Start: 2019-10-07 | End: 2020-08-24

## 2020-01-29 ENCOUNTER — PATIENT OUTREACH (OUTPATIENT)
Dept: HEALTH INFORMATION MANAGEMENT | Facility: OTHER | Age: 70
End: 2020-01-29

## 2020-01-29 NOTE — PROGRESS NOTES
Outcome: Left Message    Please transfer to Patient Outreach Team at [phone] when patient returns call.    Attempt # 1

## 2020-06-29 RX ORDER — AMLODIPINE BESYLATE 5 MG/1
TABLET ORAL
Qty: 90 TAB | Refills: 3 | Status: SHIPPED | OUTPATIENT
Start: 2020-06-29 | End: 2021-06-25

## 2020-07-02 NOTE — PROGRESS NOTES
Outcome: Left Message    Please transfer to Patient Outreach Team at [phone] when patient returns call.    [facility] Verified: yes    Attempt # final for 2020 AHA

## 2020-08-24 RX ORDER — VALSARTAN 160 MG/1
160 TABLET ORAL
Qty: 100 TAB | Refills: 3 | Status: SHIPPED | OUTPATIENT
Start: 2020-08-24 | End: 2020-12-02

## 2020-11-02 RX ORDER — ALLOPURINOL 300 MG/1
TABLET ORAL
Qty: 90 TAB | Refills: 3 | Status: SHIPPED | OUTPATIENT
Start: 2020-11-02 | End: 2021-10-27

## 2020-11-17 RX ORDER — TRIAMTERENE AND HYDROCHLOROTHIAZIDE 37.5; 25 MG/1; MG/1
TABLET ORAL
Qty: 90 TAB | Refills: 3 | Status: SHIPPED | OUTPATIENT
Start: 2020-11-17 | End: 2021-10-06

## 2020-11-17 NOTE — TELEPHONE ENCOUNTER
Received request via: Pharmacy    Was the patient seen in the last year in this department? No (08/02/2019)    Does the patient have an active prescription (recently filled or refills available) for medication(s) requested? No

## 2021-03-03 DIAGNOSIS — Z23 NEED FOR VACCINATION: ICD-10-CM

## 2021-06-25 RX ORDER — AMLODIPINE BESYLATE 5 MG/1
TABLET ORAL
Qty: 90 TABLET | Refills: 0 | Status: SHIPPED | OUTPATIENT
Start: 2021-06-25 | End: 2021-09-20

## 2021-08-13 DIAGNOSIS — M10.00 IDIOPATHIC GOUT, UNSPECIFIED CHRONICITY, UNSPECIFIED SITE: ICD-10-CM

## 2021-08-13 DIAGNOSIS — R73.9 HYPERGLYCEMIA: ICD-10-CM

## 2021-08-13 DIAGNOSIS — I10 ESSENTIAL HYPERTENSION: ICD-10-CM

## 2021-08-13 DIAGNOSIS — E78.00 PURE HYPERCHOLESTEROLEMIA: ICD-10-CM

## 2021-08-17 ENCOUNTER — HOSPITAL ENCOUNTER (OUTPATIENT)
Dept: LAB | Facility: MEDICAL CENTER | Age: 71
End: 2021-08-17
Attending: FAMILY MEDICINE
Payer: MEDICARE

## 2021-08-17 DIAGNOSIS — E78.00 PURE HYPERCHOLESTEROLEMIA: ICD-10-CM

## 2021-08-17 DIAGNOSIS — I10 ESSENTIAL HYPERTENSION: ICD-10-CM

## 2021-08-17 DIAGNOSIS — M10.00 IDIOPATHIC GOUT, UNSPECIFIED CHRONICITY, UNSPECIFIED SITE: ICD-10-CM

## 2021-08-17 DIAGNOSIS — R73.9 HYPERGLYCEMIA: ICD-10-CM

## 2021-08-17 LAB
ALBUMIN SERPL BCP-MCNC: 4.4 G/DL (ref 3.2–4.9)
ALBUMIN/GLOB SERPL: 1.3 G/DL
ALP SERPL-CCNC: 94 U/L (ref 30–99)
ALT SERPL-CCNC: 17 U/L (ref 2–50)
ANION GAP SERPL CALC-SCNC: 13 MMOL/L (ref 7–16)
AST SERPL-CCNC: 12 U/L (ref 12–45)
BILIRUB SERPL-MCNC: 0.6 MG/DL (ref 0.1–1.5)
BUN SERPL-MCNC: 13 MG/DL (ref 8–22)
CALCIUM SERPL-MCNC: 9.5 MG/DL (ref 8.5–10.5)
CHLORIDE SERPL-SCNC: 101 MMOL/L (ref 96–112)
CHOLEST SERPL-MCNC: 173 MG/DL (ref 100–199)
CO2 SERPL-SCNC: 26 MMOL/L (ref 20–33)
CREAT SERPL-MCNC: 0.95 MG/DL (ref 0.5–1.4)
ERYTHROCYTE [DISTWIDTH] IN BLOOD BY AUTOMATED COUNT: 47.2 FL (ref 35.9–50)
EST. AVERAGE GLUCOSE BLD GHB EST-MCNC: 126 MG/DL
FASTING STATUS PATIENT QL REPORTED: NORMAL
GLOBULIN SER CALC-MCNC: 3.3 G/DL (ref 1.9–3.5)
GLUCOSE SERPL-MCNC: 114 MG/DL (ref 65–99)
HBA1C MFR BLD: 6 % (ref 4–5.6)
HCT VFR BLD AUTO: 48.5 % (ref 42–52)
HDLC SERPL-MCNC: 36 MG/DL
HGB BLD-MCNC: 16.3 G/DL (ref 14–18)
LDLC SERPL CALC-MCNC: 108 MG/DL
MCH RBC QN AUTO: 30.4 PG (ref 27–33)
MCHC RBC AUTO-ENTMCNC: 33.6 G/DL (ref 33.7–35.3)
MCV RBC AUTO: 90.5 FL (ref 81.4–97.8)
PLATELET # BLD AUTO: 380 K/UL (ref 164–446)
PMV BLD AUTO: 9.6 FL (ref 9–12.9)
POTASSIUM SERPL-SCNC: 4.3 MMOL/L (ref 3.6–5.5)
PROT SERPL-MCNC: 7.7 G/DL (ref 6–8.2)
RBC # BLD AUTO: 5.36 M/UL (ref 4.7–6.1)
SODIUM SERPL-SCNC: 140 MMOL/L (ref 135–145)
TRIGL SERPL-MCNC: 144 MG/DL (ref 0–149)
TSH SERPL DL<=0.005 MIU/L-ACNC: 2.9 UIU/ML (ref 0.38–5.33)
URATE SERPL-MCNC: 6.3 MG/DL (ref 2.5–8.3)
WBC # BLD AUTO: 8.4 K/UL (ref 4.8–10.8)

## 2021-08-17 PROCEDURE — 80061 LIPID PANEL: CPT

## 2021-08-17 PROCEDURE — 84550 ASSAY OF BLOOD/URIC ACID: CPT

## 2021-08-17 PROCEDURE — 83036 HEMOGLOBIN GLYCOSYLATED A1C: CPT

## 2021-08-17 PROCEDURE — 36415 COLL VENOUS BLD VENIPUNCTURE: CPT

## 2021-08-17 PROCEDURE — 84443 ASSAY THYROID STIM HORMONE: CPT

## 2021-08-17 PROCEDURE — 85027 COMPLETE CBC AUTOMATED: CPT

## 2021-08-17 PROCEDURE — 80053 COMPREHEN METABOLIC PANEL: CPT

## 2021-08-28 ENCOUNTER — PATIENT MESSAGE (OUTPATIENT)
Dept: HEALTH INFORMATION MANAGEMENT | Facility: OTHER | Age: 71
End: 2021-08-28

## 2021-09-03 SDOH — ECONOMIC STABILITY: HOUSING INSECURITY
IN THE LAST 12 MONTHS, WAS THERE A TIME WHEN YOU DID NOT HAVE A STEADY PLACE TO SLEEP OR SLEPT IN A SHELTER (INCLUDING NOW)?: NO

## 2021-09-03 SDOH — ECONOMIC STABILITY: INCOME INSECURITY: HOW HARD IS IT FOR YOU TO PAY FOR THE VERY BASICS LIKE FOOD, HOUSING, MEDICAL CARE, AND HEATING?: NOT HARD AT ALL

## 2021-09-03 SDOH — HEALTH STABILITY: PHYSICAL HEALTH: ON AVERAGE, HOW MANY DAYS PER WEEK DO YOU ENGAGE IN MODERATE TO STRENUOUS EXERCISE (LIKE A BRISK WALK)?: 5 DAYS

## 2021-09-03 SDOH — ECONOMIC STABILITY: FOOD INSECURITY: WITHIN THE PAST 12 MONTHS, YOU WORRIED THAT YOUR FOOD WOULD RUN OUT BEFORE YOU GOT MONEY TO BUY MORE.: NEVER TRUE

## 2021-09-03 SDOH — ECONOMIC STABILITY: TRANSPORTATION INSECURITY
IN THE PAST 12 MONTHS, HAS LACK OF RELIABLE TRANSPORTATION KEPT YOU FROM MEDICAL APPOINTMENTS, MEETINGS, WORK OR FROM GETTING THINGS NEEDED FOR DAILY LIVING?: NO

## 2021-09-03 SDOH — HEALTH STABILITY: PHYSICAL HEALTH: ON AVERAGE, HOW MANY MINUTES DO YOU ENGAGE IN EXERCISE AT THIS LEVEL?: 90 MIN

## 2021-09-03 SDOH — ECONOMIC STABILITY: TRANSPORTATION INSECURITY
IN THE PAST 12 MONTHS, HAS LACK OF TRANSPORTATION KEPT YOU FROM MEETINGS, WORK, OR FROM GETTING THINGS NEEDED FOR DAILY LIVING?: NO

## 2021-09-03 SDOH — ECONOMIC STABILITY: TRANSPORTATION INSECURITY
IN THE PAST 12 MONTHS, HAS THE LACK OF TRANSPORTATION KEPT YOU FROM MEDICAL APPOINTMENTS OR FROM GETTING MEDICATIONS?: NO

## 2021-09-03 SDOH — ECONOMIC STABILITY: INCOME INSECURITY: IN THE LAST 12 MONTHS, WAS THERE A TIME WHEN YOU WERE NOT ABLE TO PAY THE MORTGAGE OR RENT ON TIME?: NO

## 2021-09-03 SDOH — HEALTH STABILITY: MENTAL HEALTH
STRESS IS WHEN SOMEONE FEELS TENSE, NERVOUS, ANXIOUS, OR CAN'T SLEEP AT NIGHT BECAUSE THEIR MIND IS TROUBLED. HOW STRESSED ARE YOU?: ONLY A LITTLE

## 2021-09-03 SDOH — ECONOMIC STABILITY: HOUSING INSECURITY: IN THE LAST 12 MONTHS, HOW MANY PLACES HAVE YOU LIVED?: 1

## 2021-09-03 SDOH — ECONOMIC STABILITY: FOOD INSECURITY: WITHIN THE PAST 12 MONTHS, THE FOOD YOU BOUGHT JUST DIDN'T LAST AND YOU DIDN'T HAVE MONEY TO GET MORE.: NEVER TRUE

## 2021-09-03 ASSESSMENT — SOCIAL DETERMINANTS OF HEALTH (SDOH)
DO YOU BELONG TO ANY CLUBS OR ORGANIZATIONS SUCH AS CHURCH GROUPS UNIONS, FRATERNAL OR ATHLETIC GROUPS, OR SCHOOL GROUPS?: NO
HOW OFTEN DO YOU ATTEND CHURCH OR RELIGIOUS SERVICES?: NEVER
HOW OFTEN DO YOU HAVE SIX OR MORE DRINKS ON ONE OCCASION: NEVER
HOW OFTEN DO YOU GET TOGETHER WITH FRIENDS OR RELATIVES?: NEVER
HOW OFTEN DO YOU ATTEND CHURCH OR RELIGIOUS SERVICES?: NEVER
IN A TYPICAL WEEK, HOW MANY TIMES DO YOU TALK ON THE PHONE WITH FAMILY, FRIENDS, OR NEIGHBORS?: MORE THAN THREE TIMES A WEEK
DO YOU BELONG TO ANY CLUBS OR ORGANIZATIONS SUCH AS CHURCH GROUPS UNIONS, FRATERNAL OR ATHLETIC GROUPS, OR SCHOOL GROUPS?: NO
HOW OFTEN DO YOU HAVE A DRINK CONTAINING ALCOHOL: NEVER
WITHIN THE PAST 12 MONTHS, YOU WORRIED THAT YOUR FOOD WOULD RUN OUT BEFORE YOU GOT THE MONEY TO BUY MORE: NEVER TRUE
HOW MANY DRINKS CONTAINING ALCOHOL DO YOU HAVE ON A TYPICAL DAY WHEN YOU ARE DRINKING: PATIENT DECLINED
HOW OFTEN DO YOU ATTENT MEETINGS OF THE CLUB OR ORGANIZATION YOU BELONG TO?: NEVER
HOW HARD IS IT FOR YOU TO PAY FOR THE VERY BASICS LIKE FOOD, HOUSING, MEDICAL CARE, AND HEATING?: NOT HARD AT ALL
ARE YOU MARRIED, WIDOWED, DIVORCED, SEPARATED, NEVER MARRIED, OR LIVING WITH A PARTNER?: NEVER MARRIED
HOW OFTEN DO YOU ATTENT MEETINGS OF THE CLUB OR ORGANIZATION YOU BELONG TO?: NEVER
HOW OFTEN DO YOU GET TOGETHER WITH FRIENDS OR RELATIVES?: NEVER
ARE YOU MARRIED, WIDOWED, DIVORCED, SEPARATED, NEVER MARRIED, OR LIVING WITH A PARTNER?: NEVER MARRIED
IN A TYPICAL WEEK, HOW MANY TIMES DO YOU TALK ON THE PHONE WITH FAMILY, FRIENDS, OR NEIGHBORS?: MORE THAN THREE TIMES A WEEK

## 2021-09-03 ASSESSMENT — LIFESTYLE VARIABLES
HOW OFTEN DO YOU HAVE A DRINK CONTAINING ALCOHOL: NEVER
HOW OFTEN DO YOU HAVE SIX OR MORE DRINKS ON ONE OCCASION: NEVER
HOW MANY STANDARD DRINKS CONTAINING ALCOHOL DO YOU HAVE ON A TYPICAL DAY: PATIENT DECLINED

## 2021-09-07 ENCOUNTER — OFFICE VISIT (OUTPATIENT)
Dept: MEDICAL GROUP | Facility: MEDICAL CENTER | Age: 71
End: 2021-09-07
Payer: MEDICARE

## 2021-09-07 VITALS
OXYGEN SATURATION: 98 % | SYSTOLIC BLOOD PRESSURE: 132 MMHG | HEART RATE: 69 BPM | BODY MASS INDEX: 32.28 KG/M2 | WEIGHT: 213 LBS | RESPIRATION RATE: 16 BRPM | TEMPERATURE: 98.7 F | HEIGHT: 68 IN | DIASTOLIC BLOOD PRESSURE: 78 MMHG

## 2021-09-07 DIAGNOSIS — M1A.9XX0 CHRONIC GOUT WITHOUT TOPHUS, UNSPECIFIED CAUSE, UNSPECIFIED SITE: ICD-10-CM

## 2021-09-07 DIAGNOSIS — Z11.59 ENCOUNTER FOR HEPATITIS C SCREENING TEST FOR LOW RISK PATIENT: ICD-10-CM

## 2021-09-07 DIAGNOSIS — E66.9 OBESITY (BMI 30.0-34.9): ICD-10-CM

## 2021-09-07 DIAGNOSIS — M10.9 GOUT, UNSPECIFIED CAUSE, UNSPECIFIED CHRONICITY, UNSPECIFIED SITE: ICD-10-CM

## 2021-09-07 DIAGNOSIS — D12.5 BENIGN NEOPLASM OF SIGMOID COLON: ICD-10-CM

## 2021-09-07 DIAGNOSIS — I10 ESSENTIAL HYPERTENSION: ICD-10-CM

## 2021-09-07 DIAGNOSIS — R73.9 HYPERGLYCEMIA: ICD-10-CM

## 2021-09-07 DIAGNOSIS — E78.00 PURE HYPERCHOLESTEROLEMIA: ICD-10-CM

## 2021-09-07 DIAGNOSIS — Z00.00 ROUTINE GENERAL MEDICAL EXAMINATION AT A HEALTH CARE FACILITY: ICD-10-CM

## 2021-09-07 DIAGNOSIS — E66.9 OBESITY (BMI 30-39.9): ICD-10-CM

## 2021-09-07 DIAGNOSIS — Z23 FLU VACCINE NEED: ICD-10-CM

## 2021-09-07 PROCEDURE — G0439 PPPS, SUBSEQ VISIT: HCPCS | Performed by: INTERNAL MEDICINE

## 2021-09-07 RX ORDER — VALSARTAN 160 MG/1
160 TABLET ORAL DAILY
COMMUNITY
End: 2021-10-04

## 2021-09-07 ASSESSMENT — PATIENT HEALTH QUESTIONNAIRE - PHQ9: CLINICAL INTERPRETATION OF PHQ2 SCORE: 0

## 2021-09-07 ASSESSMENT — ACTIVITIES OF DAILY LIVING (ADL): BATHING_REQUIRES_ASSISTANCE: 0

## 2021-09-07 ASSESSMENT — FIBROSIS 4 INDEX: FIB4 SCORE: 0.54

## 2021-09-07 ASSESSMENT — ENCOUNTER SYMPTOMS: GENERAL WELL-BEING: GOOD

## 2021-09-07 NOTE — PROGRESS NOTES
CC:  Wellness exam and follow up medical problems.    HPI:  Giovanny       Patient Active Problem List    Diagnosis Date Noted   • Obesity (BMI 30-39.9) 10/17/2017   • Flu vaccine need 10/17/2017   • Benign neoplasm of colon.  PriorColon.  He is currently due for repeat colonoscopy and was reminded of that.  He reports no recent change in bowel habits. 04/04/2017   • Need for Streptococcus pneumoniae vaccination 12/27/2016   • Pure hypercholesterolemia.  Hyperlipidemia under borderline unsatisfactory control.  We reviewed appropriate diet as well as importance weight loss and eating more soluble fiber.  12/27/2016   • Routine general medical examination at a health care facility 06/27/2016   • Hyperglycemia.  We discussed appropriate diet as well as weight loss and exercise.  Monitor sugars periodically. 02/29/2016   • Onychomycosis 02/29/2016   • Gout.  We briefly reviewed appropriate diet.  Continue allopurinol.  Report any gout attacks. 11/18/2015   • Obesity (BMI 30.0-34.9).  Work at weight loss through appropriate diet and exercise. 11/18/2015   • Essential hypertension.  Continue valsartan and amlodipine and Dyazide and low-salt diet and work at weight loss through appropriate diet and exercise. 11/18/2015       Current Outpatient Medications   Medication Sig Dispense Refill   • valsartan (DIOVAN) 160 MG Tab Take 160 mg by mouth every day.     • amLODIPine (NORVASC) 5 MG Tab TAKE 1 TABLET BY MOUTH EVERY DAY 90 tablet 0   • triamterene-hctz (MAXZIDE-25/DYAZIDE) 37.5-25 MG Tab TAKE 1 TABLET BY MOUTH EVERY DAY 90 Tab 3   • allopurinol (ZYLOPRIM) 300 MG Tab TAKE 1 TABLET BY MOUTH EVERY DAY 90 Tab 3     No current facility-administered medications for this visit.        Patient has no known allergies.    Social History     Socioeconomic History   • Marital status: Single     Spouse name: Not on file   • Number of children: Not on file   • Years of education: Not on file   • Highest education level: Some college, no  degree   Occupational History   • Not on file   Tobacco Use   • Smoking status: Former Smoker     Packs/day: 2.50     Years: 25.00     Pack years: 62.50     Types: Cigarettes     Quit date: 1996     Years since quittin.3   • Smokeless tobacco: Never Used   • Tobacco comment: Started smoking at age 21 smoked off and on for 45 years   Vaping Use   • Vaping Use: Never used   Substance and Sexual Activity   • Alcohol use: Yes     Alcohol/week: 18.0 - 21.0 oz     Types: 30 - 35 Glasses of wine per week   • Drug use: No   • Sexual activity: Never     Partners: Female   Other Topics Concern   • Not on file   Social History Narrative   • Not on file     Social Determinants of Health     Financial Resource Strain: Low Risk    • Difficulty of Paying Living Expenses: Not hard at all   Food Insecurity: No Food Insecurity   • Worried About Running Out of Food in the Last Year: Never true   • Ran Out of Food in the Last Year: Never true   Transportation Needs: No Transportation Needs   • Lack of Transportation (Medical): No   • Lack of Transportation (Non-Medical): No   Physical Activity: Sufficiently Active   • Days of Exercise per Week: 5 days   • Minutes of Exercise per Session: 90 min   Stress: No Stress Concern Present   • Feeling of Stress : Only a little   Social Connections: Socially Isolated   • Frequency of Communication with Friends and Family: More than three times a week   • Frequency of Social Gatherings with Friends and Family: Never   • Attends Hindu Services: Never   • Active Member of Clubs or Organizations: No   • Attends Club or Organization Meetings: Never   • Marital Status: Never    Intimate Partner Violence:    • Fear of Current or Ex-Partner:    • Emotionally Abused:    • Physically Abused:    • Sexually Abused:        Family History   Problem Relation Age of Onset   • Hypertension Mother         HO   • No Known Problems Sister    • No Known Problems Sister    • Diabetes Maternal  "Grandmother    • No Known Problems Maternal Grandfather    • Dementia Paternal Grandmother    • No Known Problems Father        Past Surgical History:   Procedure Laterality Date   • LOW ANTERIOR RESECTION ROBOTIC XI  4/4/2017    Procedure: LOW ANTERIOR RESECTION ROBOTIC XI;  Surgeon: Grayson Chan M.D.;  Location: SURGERY [facility];  Service:    • ABDOMINAL EXPLORATION  04/2017    large polyp removed   • OTHER ABDOMINAL SURGERY      hernia   • RHINOPLASTY         ROS:  Denies any Headache, Blurred Vision, Confusion Chest pain,  Shortness of breath,  Abdominal pain, Changes of bowel or bladder, Lower ext edema, Fevers, Nights sweats, Weight Changes, Focal weakness or numbness.  All other systems are negative.    /78 (BP Location: Left arm)   Pulse 69   Temp 37.1 °C (98.7 °F)   Resp 16   Ht 1.727 m (5' 8\")   Wt 96.6 kg (213 lb)   SpO2 98%   BMI 32.39 kg/m²     Physical Exam:  Gen:         Alert and oriented, No apparent distress.  HEENT:   Perrla, TM clear,  Oralpharynx no erythema or exudates.  Neck:       No Jugular venous distension, Lymphadenopathy, Thyromegaly, Bruits.  Lungs:     Clear to auscultation bilaterally  CV:          Regular rate and rhythm. No murmurs, rubs or gallops.  Abd:         Soft non tender, non distended. Normal active bowel sounds. No                    Hepatosplenomegaly, No pulsatile masses.  Ext:          No clubbing, cyanosis, edema.  Neuro:     CN II-XII grossly intact.  Strength 5/5 throughout.  DTR's equal and            symmetrical both upper and lower extremities. Down going Babinski.  .  MS:          Full range motion all joints no major deformities.  Skin:        No concerning lesions or rashes.    Health Maintenance Due   Topic Date Due   • HEPATITIS C SCREENING  Never done   • COLORECTAL CANCER SCREENING  02/20/2018   • IMM INFLUENZA (1) 09/01/2021     Depression Screening    Little interest or pleasure in doing things?  0 - not at all  Feeling down, " depressed , or hopeless? 0 - not at all  Patient Health Questionnaire Score: 0     If depressive symptoms identified deferred to follow up visit unless specifically addressed in assessment and plan.    Interpretation of PHQ-9 Total Score   Score Severity   1-4 No Depression   5-9 Mild Depression   10-14 Moderate Depression   15-19 Moderately Severe Depression   20-27 Severe Depression    Screening for Cognitive Impairment    Three Minute Recall (captain, garden, picture) 3/3    Devante clock face with all 12 numbers and set the hands to show 5 past 8.  Yes 5/5 8:05  Cognitive concerns identified deferred for follow up unless specifically addressed in assessment and plan.    Fall Risk Assessment    Has the patient had two or more falls in the last year or any fall with injury in the last year?  No    Safety Assessment    Throw rugs on floor.  No  Handrails on all stairs.  No  Good lighting in all hallways.  Yes  Difficulty hearing.  No  Patient counseled about all safety risks that were identified.    Functional Assessment ADLs    Are there any barriers preventing you from cooking for yourself or meeting nutritional needs?  No.    Are there any barriers preventing you from driving safely or obtaining transportation?  No.    Are there any barriers preventing you from using a telephone or calling for help?  No.    Are there any barriers preventing you from shopping?  No.    Are there any barriers preventing you from taking care of your own finances?  No.    Are there any barriers preventing you from managing your medications?  No.    Are there any barriers preventing you from showering, bathing or dressing yourself?  No.    Are you currently engaging in any exercise or physical activity?  Yes.     What is your perception of your health?  Good.      Health Maintenance Summary                HEPATITIS C SCREENING Overdue 1950     COLORECTAL CANCER SCREENING Overdue 2/20/2018     IMM INFLUENZA Overdue 9/1/2021      Done  9/2/2020 Ext Imm: Influenza Quad Adjuvanted     Patient has more history with this topic...    IMM DTaP/Tdap/Td Vaccine Next Due 8/10/2022      Done 8/10/2012 Imm Admin: Tdap Vaccine    Annual Wellness Visit Next Due 9/8/2022      Done 9/7/2021      Patient has more history with this topic...          Patient Care Team:  Darrius Magaña M.D. as PCP - General  Toi Sparks O.D. as Consulting Physician (Optometry)  Judith Webber as          Assessment and Plan.  71 y.o.     #1 wellness exam  Screening test reviewed with patient today and updated within health-care maintenance record. Discussion today about general wellness and lifestyle habits.

## 2021-09-07 NOTE — PROGRESS NOTES
Chief Complaint   Patient presents with   • Annual Exam         HPI:  Paras is a 71 y.o. here for Medicare Annual Wellness Visit    ***    Patient Active Problem List    Diagnosis Date Noted   • Obesity (BMI 30-39.9) 10/17/2017   • Flu vaccine need 10/17/2017   • Benign neoplasm of colon 04/04/2017   • Need for Streptococcus pneumoniae vaccination 12/27/2016   • Pure hypercholesterolemia 12/27/2016   • Routine general medical examination at a health care facility 06/27/2016   • Hyperglycemia 02/29/2016   • Onychomycosis 02/29/2016   • Gout 11/18/2015   • Obesity (BMI 30.0-34.9) 11/18/2015   • Essential hypertension 11/18/2015       Current Outpatient Medications   Medication Sig Dispense Refill   • valsartan (DIOVAN) 160 MG Tab Take 160 mg by mouth every day.     • amLODIPine (NORVASC) 5 MG Tab TAKE 1 TABLET BY MOUTH EVERY DAY 90 tablet 0   • triamterene-hctz (MAXZIDE-25/DYAZIDE) 37.5-25 MG Tab TAKE 1 TABLET BY MOUTH EVERY DAY 90 Tab 3   • allopurinol (ZYLOPRIM) 300 MG Tab TAKE 1 TABLET BY MOUTH EVERY DAY 90 Tab 3     No current facility-administered medications for this visit.        {MEDICATION ADHERENCE:32087}  Current supplements as per medication list.     Allergies: Patient has no known allergies.    Current social contact/activities: Walking    Is patient current with immunizations? Yes.    He  reports that he quit smoking about 25 years ago. His smoking use included cigarettes. He has a 62.50 pack-year smoking history. He has never used smokeless tobacco. He reports current alcohol use of about 18.0 - 21.0 oz of alcohol per week. He reports that he does not use drugs.  Counseling given: Not Answered  Comment: Started smoking at age 21 smoked off and on for 45 years        DPA/Advanced directive: Patient does not have an Advanced Directive.  A packet and workshop information was given on Advanced Directives.    ROS:    Gait: Uses no assistive device   Ostomy: No   Other tubes: No   Amputations: No   Chronic  oxygen use No   Last eye exam 9/2020  Wears hearing aids: No   : Denies any urinary leakage during the last 6 months      Screening:    ***    Depression Screening    Little interest or pleasure in doing things?  0 - not at all  Feeling down, depressed, or hopeless? 0 - not at all  Patient Health Questionnaire Score: 0    If depressive symptoms identified deferred to follow up visit unless specifically addressed in assessment and plan.    Interpretation of PHQ-9 Total Score   Score Severity   1-4 No Depression   5-9 Mild Depression   10-14 Moderate Depression   15-19 Moderately Severe Depression   20-27 Severe Depression    Screening for Cognitive Impairment    Three Minute Recall (captain, garden, picture)  3/3    Devante clock face with all 12 numbers and set the hands to show 5 past 8.  Yes 5/5 8:05  If cognitive concerns identified, deferred for follow up unless specifically addressed in assessment and plan.    Fall Risk Assessment    Has the patient had two or more falls in the last year or any fall with injury in the last year?  No  If fall risk identified, deferred for follow up unless specifically addressed in assessment and plan.    Safety Assessment    Throw rugs on floor.  No  Handrails on all stairs.  No  Good lighting in all hallways.  Yes  Difficulty hearing.  No  Patient counseled about all safety risks that were identified.    Functional Assessment ADLs    Are there any barriers preventing you from cooking for yourself or meeting nutritional needs?  No.    Are there any barriers preventing you from driving safely or obtaining transportation?  No.    Are there any barriers preventing you from using a telephone or calling for help?  No.    Are there any barriers preventing you from shopping?  No.    Are there any barriers preventing you from taking care of your own finances?  No.    Are there any barriers preventing you from managing your medications?  No.    Are there any barriers preventing you from  showering, bathing or dressing yourself?  No.    Are you currently engaging in any exercise or physical activity?  Yes.     What is your perception of your health?  Good.    Health Maintenance Summary                HEPATITIS C SCREENING Overdue 1950     COLORECTAL CANCER SCREENING Overdue 2018     Annual Wellness Visit Overdue 2018      Not specified 2017     IMM INFLUENZA Overdue 2021      Done 2020 Ext Imm: Influenza Quad Adjuvanted     Patient has more history with this topic...    IMM DTaP/Tdap/Td Vaccine Next Due 8/10/2022      Done 8/10/2012 Imm Admin: Tdap Vaccine          Patient Care Team:  Darrius Magaña M.D. as PCP - General  Toi Sparks O.D. as Consulting Physician (Optometry)  Judith Webber as      Social History     Tobacco Use   • Smoking status: Former Smoker     Packs/day: 2.50     Years: 25.00     Pack years: 62.50     Types: Cigarettes     Quit date: 1996     Years since quittin.3   • Smokeless tobacco: Never Used   • Tobacco comment: Started smoking at age 21 smoked off and on for 45 years   Vaping Use   • Vaping Use: Never used   Substance Use Topics   • Alcohol use: Yes     Alcohol/week: 18.0 - 21.0 oz     Types: 30 - 35 Glasses of wine per week   • Drug use: No     Family History   Problem Relation Age of Onset   • Hypertension Mother         HO   • No Known Problems Sister    • No Known Problems Sister    • Diabetes Maternal Grandmother    • No Known Problems Maternal Grandfather    • Dementia Paternal Grandmother    • No Known Problems Father      He  has a past medical history of Colon neoplasm, Dental disorder, Gout (2015), Hepatitis A, Hyperglycemia (2016), Hypertension, Jaundice, Onychomycosis (2016), and Routine general medical examination at a health care facility (2016).   Past Surgical History:   Procedure Laterality Date   • LOW ANTERIOR RESECTION ROBOTIC XI  2017    Procedure: LOW  "ANTERIOR RESECTION ROBOTIC XI;  Surgeon: Grayson Chan M.D.;  Location: SURGERY [facility];  Service:    • ABDOMINAL EXPLORATION  04/2017    large polyp removed   • OTHER ABDOMINAL SURGERY      hernia   • RHINOPLASTY             Exam:     /78 (BP Location: Left arm)   Pulse 69   Temp 37.1 °C (98.7 °F)   Resp 16   Ht 1.727 m (5' 8\")   Wt 96.6 kg (213 lb)   SpO2 98%  Body mass index is 32.39 kg/m².     G      {GOOD/FAIR/POOR/EXCELLENT   :30241}.    Dentition {DENTITION:24902}  Alert, oriented in no acute distress.  Eye contact is good, speech goal directed, affect calm  ***    Assessment and Plan. The following treatment and monitoring plan is recommended: ***  No diagnosis found.      Services suggested: { AWV COORDINATION OF SERVICES:20405}  Health Care Screening recommendations as per orders if indicated.  Referrals offered: PT/OT/Nutrition counseling/Behavioral Health/Smoking cessation as per orders if indicated.    Discussion today about general wellness and lifestyle habits:    · Prevent falls and reduce trip hazards; Cautioned about securing or removing rugs.  · Have a working fire alarm and carbon monoxide detector;   · Engage in regular physical activity and social activities       Follow-up: No follow-ups on file.  "

## 2021-09-07 NOTE — ASSESSMENT & PLAN NOTE
He remains significantly overweight with BMI 32.39.  He tries to follow appropriate diet with limited success.  He is not in much of an exercise program.

## 2021-09-20 RX ORDER — AMLODIPINE BESYLATE 5 MG/1
TABLET ORAL
Qty: 90 TABLET | Refills: 3 | Status: SHIPPED | OUTPATIENT
Start: 2021-09-20 | End: 2022-09-19

## 2021-10-05 ENCOUNTER — OFFICE VISIT (OUTPATIENT)
Dept: MEDICAL GROUP | Facility: MEDICAL CENTER | Age: 71
End: 2021-10-05
Payer: MEDICARE

## 2021-10-05 VITALS
WEIGHT: 214 LBS | OXYGEN SATURATION: 96 % | RESPIRATION RATE: 16 BRPM | SYSTOLIC BLOOD PRESSURE: 124 MMHG | HEIGHT: 68 IN | BODY MASS INDEX: 32.43 KG/M2 | TEMPERATURE: 97.6 F | HEART RATE: 74 BPM | DIASTOLIC BLOOD PRESSURE: 78 MMHG

## 2021-10-05 DIAGNOSIS — R73.9 HYPERGLYCEMIA: ICD-10-CM

## 2021-10-05 DIAGNOSIS — D12.5 BENIGN NEOPLASM OF SIGMOID COLON: ICD-10-CM

## 2021-10-05 DIAGNOSIS — E78.00 PURE HYPERCHOLESTEROLEMIA: ICD-10-CM

## 2021-10-05 DIAGNOSIS — E66.9 OBESITY (BMI 30-39.9): ICD-10-CM

## 2021-10-05 DIAGNOSIS — I10 ESSENTIAL HYPERTENSION: ICD-10-CM

## 2021-10-05 DIAGNOSIS — R21 SKIN RASH: ICD-10-CM

## 2021-10-05 PROCEDURE — 99214 OFFICE O/P EST MOD 30 MIN: CPT | Performed by: FAMILY MEDICINE

## 2021-10-05 RX ORDER — TRIAMCINOLONE ACETONIDE 1 MG/G
1 CREAM TOPICAL 2 TIMES DAILY
Qty: 45 G | Refills: 2 | Status: SHIPPED | OUTPATIENT
Start: 2021-10-05 | End: 2022-04-13

## 2021-10-05 ASSESSMENT — FIBROSIS 4 INDEX: FIB4 SCORE: 0.54

## 2021-10-05 NOTE — PROGRESS NOTES
CC: New patient: Hypertension, hyperglycemia, hypercholesterolemia, obesity, skin rash, history of colonic polyp.    HPI:  Giovanny presents today to establish new PCP.    Patient has been active and independent with all ADLs.  The following chronic medical issues:    Essential hypertension  Blood pressure has been adequately controlled on losartan 60 mg daily, amlodipine 5 mg daily, triamterene/hydrochlorothiazide 37.5-25 mg daily.  No side effects    Hyperglycemia  Blood glucose has been always above 100, patient has impaired glucose tolerance.  Patient does not have history of CAD, or stroke.    Pure hypercholesterolemia  LDL has been slightly high,as follows:   Ref Range & Units 1 mo ago   Cholesterol,Tot 100 - 199 mg/dL 173    Triglycerides 0 - 149 mg/dL 144    HDL >=40 mg/dL 36 Abnormal     LDL <100 mg/dL 108 High       However he has no history of diabetes, CAD, or stroke    Obesity (BMI 30-39.9)  BMI 32.5. The medical rationale for weight loss in obese individuals is that obesity is associated with a significant increase in mortality, and many health risks including type 2 diabetes mellitus, hypertension, dyslipidemia, and coronary heart disease. The benefits of weight loss include a reduction in the rate of progression from impaired glucose tolerance to diabetes, blood pressure in hypertensive patients, and lipid levels in higher risk patients. Other noncardiac benefits of weight loss include reductions in urinary incontinence, sleep apnea, and depression, and improvements in quality of life, physical functioning, and mobility.Recommend lifestyle modification: exercise 30 minutes per day 5 days per week. Recommend also portion control.    Skin rash  Has been having scaly erythematous itchy skin rash in the medial aspect of both lower extremities, has been very itchy.  Any skin moisturizers might help with the itching but not with the rash.      Benign neoplasm of sigmoid colon  Patient has history of large  benign polyp, status post partial colectomy in 2017.  Patient advised to repeat the colonoscopy in 3 years, but he did not because of the COVID-19 pandemic.  Wants referral to GI        Patient Active Problem List    Diagnosis Date Noted   • Obesity (BMI 30-39.9) 10/17/2017   • Flu vaccine need 10/17/2017   • Benign neoplasm of colon 2017   • Need for Streptococcus pneumoniae vaccination 2016   • Pure hypercholesterolemia 2016   • Routine general medical examination at a health care facility 2016   • Hyperglycemia 2016   • Onychomycosis 2016   • Gout 2015   • Obesity (BMI 30.0-34.9) 2015   • Essential hypertension 2015       Current Outpatient Medications   Medication Sig Dispense Refill   • triamcinolone acetonide (KENALOG) 0.1 % Cream Apply 1 Application topically 2 times a day. 45 g 2   • valsartan (DIOVAN) 160 MG Tab TAKE 1 TAB BY MOUTH EVERY DAY  DAYS. FOR BLOOD PRESSURE 100 Tablet 0   • amLODIPine (NORVASC) 5 MG Tab TAKE 1 TABLET BY MOUTH EVERY DAY 90 Tablet 3   • triamterene-hctz (MAXZIDE-25/DYAZIDE) 37.5-25 MG Tab TAKE 1 TABLET BY MOUTH EVERY DAY 90 Tab 3   • allopurinol (ZYLOPRIM) 300 MG Tab TAKE 1 TABLET BY MOUTH EVERY DAY 90 Tab 3     No current facility-administered medications for this visit.         Allergies as of 10/05/2021   • (No Known Allergies)        Social History     Socioeconomic History   • Marital status: Single     Spouse name: Not on file   • Number of children: Not on file   • Years of education: Not on file   • Highest education level: Some college, no degree   Occupational History   • Not on file   Tobacco Use   • Smoking status: Former Smoker     Packs/day: 2.50     Years: 25.00     Pack years: 62.50     Types: Cigarettes     Quit date: 1996     Years since quittin.4   • Smokeless tobacco: Never Used   • Tobacco comment: Started smoking at age 21 smoked off and on for 45 years   Vaping Use   • Vaping Use: Never  used   Substance and Sexual Activity   • Alcohol use: Yes     Alcohol/week: 18.0 - 21.0 oz     Types: 30 - 35 Glasses of wine per week   • Drug use: No   • Sexual activity: Never     Partners: Female   Other Topics Concern   • Not on file   Social History Narrative   • Not on file     Social Determinants of Health     Financial Resource Strain: Low Risk    • Difficulty of Paying Living Expenses: Not hard at all   Food Insecurity: No Food Insecurity   • Worried About Running Out of Food in the Last Year: Never true   • Ran Out of Food in the Last Year: Never true   Transportation Needs: No Transportation Needs   • Lack of Transportation (Medical): No   • Lack of Transportation (Non-Medical): No   Physical Activity: Sufficiently Active   • Days of Exercise per Week: 5 days   • Minutes of Exercise per Session: 90 min   Stress: No Stress Concern Present   • Feeling of Stress : Only a little   Social Connections: Socially Isolated   • Frequency of Communication with Friends and Family: More than three times a week   • Frequency of Social Gatherings with Friends and Family: Never   • Attends Catholic Services: Never   • Active Member of Clubs or Organizations: No   • Attends Club or Organization Meetings: Never   • Marital Status: Never    Intimate Partner Violence:    • Fear of Current or Ex-Partner:    • Emotionally Abused:    • Physically Abused:    • Sexually Abused:        Family History   Problem Relation Age of Onset   • Hypertension Mother         HO   • No Known Problems Sister    • No Known Problems Sister    • Diabetes Maternal Grandmother    • No Known Problems Maternal Grandfather    • Dementia Paternal Grandmother    • No Known Problems Father        Past Surgical History:   Procedure Laterality Date   • LOW ANTERIOR RESECTION ROBOTIC XI  4/4/2017    Procedure: LOW ANTERIOR RESECTION ROBOTIC XI;  Surgeon: Grayson Chan M.D.;  Location: SURGERY [facility];  Service:    • ABDOMINAL EXPLORATION  " 04/2017    large polyp removed   • OTHER ABDOMINAL SURGERY      hernia   • RHINOPLASTY         ROS:  Denies any Headache, Blurred Vision, Confusion Chest pain,  Shortness of breath,  Abdominal pain, Changes of bowel or bladder, Lower ext edema, Fevers, Nights sweats, Weight Changes, Focal weakness or numbness.  All other systems are negative.    /78   Pulse 74   Temp 36.4 °C (97.6 °F)   Resp 16   Ht 1.727 m (5' 8\")   Wt 97.1 kg (214 lb)   SpO2 96%   BMI 32.54 kg/m²     Physical Exam:  Gen:         Alert and oriented, No apparent distress.  HEENT:   Perrla, TM clear,  Oralpharynx no erythema or exudates.  Neck:       No Jugular venous distension, Lymphadenopathy, Thyromegaly, Bruits.  Lungs:     Clear to auscultation bilaterally  CV:          Regular rate and rhythm. No murmurs, rubs or gallops.  Abd:         Soft non tender, non distended. Normal active bowel sounds. No                                        Hepatosplenomegaly, No pulsatile masses.  Ext:          No clubbing, cyanosis, edema.  Skin         erythematous scaly maculopapular rash on the medial aspect of the lower extremities bilaterally.    Assessment and Plan.   71 y.o. male     1. Essential hypertension  Controlled.  Continue on losartan 60 mg daily, amlodipine 5 mg daily, triamterene/hydrochlorothiazide 37.5-25 mg daily    2. Hyperglycemia  Blood glucose has been always above 100, patient has impaired glucose tolerance.  Patient is counseled on lifestyle modification  We will continue monitor blood glucose    3. Pure hypercholesterolemia  LDL has been slightly high, history of diabetes, CAD, or stroke  Patient is counseled on lifestyle modification.    4. Obesity (BMI 30-39.9)  BMI 32.5  Patient is counseled on lifestyle patient    5. Skin rash  Probably some eczematous rash  Start patient Kenalog cream  Patient advised to keep the skin moist all the time by using skin moisturizers.    - triamcinolone acetonide (KENALOG) 0.1 % Cream; " Apply 1 Application topically 2 times a day.  Dispense: 45 g; Refill: 2    6. Benign neoplasm of sigmoid colon  History of large benign polyp, status post partial colectomy in 2017.  Patient advised to repeat the colonoscopy in 3 years, but he did not because of the COVID-19 pandemic.  Wants referral to GI    - REFERRAL TO GI FOR COLONOSCOPY

## 2021-10-06 RX ORDER — TRIAMTERENE AND HYDROCHLOROTHIAZIDE 37.5; 25 MG/1; MG/1
TABLET ORAL
Qty: 90 TABLET | Refills: 3 | Status: SHIPPED | OUTPATIENT
Start: 2021-10-06 | End: 2022-11-11

## 2021-10-06 RX ORDER — VALSARTAN 160 MG/1
TABLET ORAL
Qty: 100 TABLET | Refills: 0 | Status: SHIPPED | OUTPATIENT
Start: 2021-10-06 | End: 2022-03-29

## 2021-10-27 RX ORDER — ALLOPURINOL 300 MG/1
TABLET ORAL
Qty: 90 TABLET | Refills: 3 | Status: SHIPPED | OUTPATIENT
Start: 2021-10-27 | End: 2022-10-27

## 2021-11-09 ENCOUNTER — TELEPHONE (OUTPATIENT)
Dept: HEALTH INFORMATION MANAGEMENT | Facility: OTHER | Age: 71
End: 2021-11-09

## 2021-11-10 NOTE — TELEPHONE ENCOUNTER
Outcome: Left Message    Please transfer to Patient Outreach Team at [phone] when patient returns call.    Attempt # 2

## 2022-03-18 ENCOUNTER — PATIENT MESSAGE (OUTPATIENT)
Dept: HEALTH INFORMATION MANAGEMENT | Facility: OTHER | Age: 72
End: 2022-03-18

## 2022-03-29 RX ORDER — VALSARTAN 160 MG/1
TABLET ORAL
Qty: 100 TABLET | Refills: 0 | Status: SHIPPED | OUTPATIENT
Start: 2022-03-29 | End: 2022-07-07

## 2022-04-05 ENCOUNTER — HOSPITAL ENCOUNTER (OUTPATIENT)
Dept: LAB | Facility: MEDICAL CENTER | Age: 72
End: 2022-04-05
Attending: INTERNAL MEDICINE
Payer: MEDICARE

## 2022-04-05 DIAGNOSIS — E78.00 PURE HYPERCHOLESTEROLEMIA: ICD-10-CM

## 2022-04-05 DIAGNOSIS — Z11.59 ENCOUNTER FOR HEPATITIS C SCREENING TEST FOR LOW RISK PATIENT: ICD-10-CM

## 2022-04-05 DIAGNOSIS — I10 ESSENTIAL HYPERTENSION: ICD-10-CM

## 2022-04-05 LAB
ANION GAP SERPL CALC-SCNC: 14 MMOL/L (ref 7–16)
BUN SERPL-MCNC: 13 MG/DL (ref 8–22)
CALCIUM SERPL-MCNC: 9.7 MG/DL (ref 8.5–10.5)
CHLORIDE SERPL-SCNC: 99 MMOL/L (ref 96–112)
CHOLEST SERPL-MCNC: 187 MG/DL (ref 100–199)
CO2 SERPL-SCNC: 24 MMOL/L (ref 20–33)
CREAT SERPL-MCNC: 0.71 MG/DL (ref 0.5–1.4)
FASTING STATUS PATIENT QL REPORTED: NORMAL
GFR SERPLBLD CREATININE-BSD FMLA CKD-EPI: 98 ML/MIN/1.73 M 2
GLUCOSE SERPL-MCNC: 116 MG/DL (ref 65–99)
HCV AB SER QL: NORMAL
HDLC SERPL-MCNC: 51 MG/DL
LDLC SERPL CALC-MCNC: 105 MG/DL
POTASSIUM SERPL-SCNC: 3.9 MMOL/L (ref 3.6–5.5)
SODIUM SERPL-SCNC: 137 MMOL/L (ref 135–145)
TRIGL SERPL-MCNC: 155 MG/DL (ref 0–149)

## 2022-04-05 PROCEDURE — 86803 HEPATITIS C AB TEST: CPT

## 2022-04-05 PROCEDURE — 80048 BASIC METABOLIC PNL TOTAL CA: CPT

## 2022-04-05 PROCEDURE — 80061 LIPID PANEL: CPT

## 2022-04-05 PROCEDURE — 36415 COLL VENOUS BLD VENIPUNCTURE: CPT

## 2022-04-13 ENCOUNTER — OFFICE VISIT (OUTPATIENT)
Dept: MEDICAL GROUP | Facility: MEDICAL CENTER | Age: 72
End: 2022-04-13
Payer: MEDICARE

## 2022-04-13 VITALS
SYSTOLIC BLOOD PRESSURE: 112 MMHG | HEART RATE: 68 BPM | DIASTOLIC BLOOD PRESSURE: 60 MMHG | OXYGEN SATURATION: 96 % | WEIGHT: 217 LBS | BODY MASS INDEX: 32.89 KG/M2 | HEIGHT: 68 IN | RESPIRATION RATE: 16 BRPM | TEMPERATURE: 97.6 F

## 2022-04-13 DIAGNOSIS — E78.00 PURE HYPERCHOLESTEROLEMIA: ICD-10-CM

## 2022-04-13 DIAGNOSIS — I10 ESSENTIAL HYPERTENSION: ICD-10-CM

## 2022-04-13 DIAGNOSIS — R73.9 HYPERGLYCEMIA: ICD-10-CM

## 2022-04-13 DIAGNOSIS — M10.9 GOUT, ARTHRITIS: ICD-10-CM

## 2022-04-13 PROCEDURE — 99214 OFFICE O/P EST MOD 30 MIN: CPT | Performed by: FAMILY MEDICINE

## 2022-04-13 ASSESSMENT — PATIENT HEALTH QUESTIONNAIRE - PHQ9: CLINICAL INTERPRETATION OF PHQ2 SCORE: 0

## 2022-04-13 ASSESSMENT — FIBROSIS 4 INDEX: FIB4 SCORE: 0.54

## 2022-04-13 NOTE — PROGRESS NOTES
CC: Hypertension, hyperglycemia, hyperlipidemia, gout arthritis    HPI:   Giovanny presents today to discuss the following:    Essential hypertension  Has been adequately controlled on current medication. Denies headache, chest pain, and SOB.  Patient has been on Diovan 160 mg daily, amlodipine 5 mg daily, and triamterene hydrochlorothiazide 37.5-25 mg daily    Hyperglycemia  Mild hyperglycemia/prediabetes, no history of diabetes.  Denies any polyuria polydipsia.    Pure hypercholesterolemia  Most recent lipid panel showed:  Cholesterol,Tot 100 - 199 mg/dL 187  173  166  169  136  188    Triglycerides 0 - 149 mg/dL 155 High   144  107  89  88  80    HDL >=40 mg/dL 51  36 Abnormal   60  60  39 Abnormal   58    LDL <100 mg/dL 105 High           ASCVD risk score is 16%, so her statin is recommended. Patient wants to try diet control for a while.  Patient has no history of diabetes, CAD, or stroke    Gout, arthritis  Patient has been doing fine on allopurinol 300 mg daily.  No acute episodes for a while      Patient Active Problem List    Diagnosis Date Noted   • Obesity (BMI 30-39.9) 10/17/2017   • Flu vaccine need 10/17/2017   • Benign neoplasm of colon 04/04/2017   • Need for Streptococcus pneumoniae vaccination 12/27/2016   • Pure hypercholesterolemia 12/27/2016   • Routine general medical examination at a health care facility 06/27/2016   • Hyperglycemia 02/29/2016   • Onychomycosis 02/29/2016   • Gout 11/18/2015   • Obesity (BMI 30.0-34.9) 11/18/2015   • Essential hypertension 11/18/2015       Current Outpatient Medications   Medication Sig Dispense Refill   • valsartan (DIOVAN) 160 MG Tab TAKE 1 TAB BY MOUTH EVERY DAY  DAYS. FOR BLOOD PRESSURE 100 Tablet 0   • allopurinol (ZYLOPRIM) 300 MG Tab TAKE 1 TABLET BY MOUTH EVERY DAY 90 Tablet 3   • triamterene-hctz (MAXZIDE-25/DYAZIDE) 37.5-25 MG Tab TAKE 1 TABLET BY MOUTH EVERY DAY 90 Tablet 3   • amLODIPine (NORVASC) 5 MG Tab TAKE 1 TABLET BY MOUTH EVERY DAY 90  "Tablet 3     No current facility-administered medications for this visit.         Allergies as of 04/13/2022   • (No Known Allergies)        ROS: Denies any chest pain, Shortness of breath, Changes bowel or bladder, Lower extremity edema.    Physical Exam:  /60 (BP Location: Right arm, Patient Position: Sitting, BP Cuff Size: Adult)   Pulse 68   Temp 36.4 °C (97.6 °F) (Temporal)   Resp 16   Ht 1.727 m (5' 8\")   Wt 98.4 kg (217 lb)   SpO2 96%   BMI 32.99 kg/m²   Gen.: Well-developed, well-nourished, no apparent distress,pleasant and cooperative with the examination  Skin:  Warm and dry with good turgor. No rashes or suspicious lesions in visible areas  HEENT:Sinuses nontender with palpation, TMs clear, nares patent with pink mucosa and clear rhinorrhea,no septal deviation ,polyps or lesions. lips without lesions, oropharynx clear.  Neck: Trachea midline,no masses or adenopathy. No JVD.  Cor: Regular rate and rhythm without murmur, gallop or rub.  Lungs: Respirations unlabored.Clear to auscultation with equal breath sounds bilaterally. No wheezes, rhonchi.  Extremities: No cyanosis, clubbing or edema.      Assessment and Plan.   71 y.o. male     1. Essential hypertension  Controlled.  Continue on losartan, amlodipine, and triamterene hydrochlorothiazide    2. Hyperglycemia  Mild hyperglycemia/prediabetes, no history of diabetes.  Patient is counseled on lifestyle modification.  We will continue monitor blood glucose    3. Pure hypercholesterolemia  Most recent lipid panel showed slightly high cholesterol and LDL.ASCVD risk score is 16%, so her statin is recommended.  Patient wants to try diet control for a while, will repeat lipid panel next visit.    - Lipid Profile; Future    4. Gout, arthritis  Patient has been doing fine on allopurinol 300 mg daily.      "

## 2022-07-07 RX ORDER — VALSARTAN 160 MG/1
TABLET ORAL
Qty: 100 TABLET | Refills: 0 | Status: SHIPPED | OUTPATIENT
Start: 2022-07-07 | End: 2022-10-16

## 2022-07-27 ENCOUNTER — TELEPHONE (OUTPATIENT)
Dept: HEALTH INFORMATION MANAGEMENT | Facility: OTHER | Age: 72
End: 2022-07-27
Payer: MEDICARE

## 2022-08-15 ENCOUNTER — HOSPITAL ENCOUNTER (OUTPATIENT)
Dept: LAB | Facility: MEDICAL CENTER | Age: 72
End: 2022-08-15
Attending: FAMILY MEDICINE
Payer: MEDICARE

## 2022-08-15 DIAGNOSIS — E78.00 PURE HYPERCHOLESTEROLEMIA: ICD-10-CM

## 2022-08-15 LAB
CHOLEST SERPL-MCNC: 191 MG/DL (ref 100–199)
FASTING STATUS PATIENT QL REPORTED: NORMAL
HDLC SERPL-MCNC: 55 MG/DL
LDLC SERPL CALC-MCNC: 109 MG/DL
TRIGL SERPL-MCNC: 136 MG/DL (ref 0–149)

## 2022-08-15 PROCEDURE — 80061 LIPID PANEL: CPT

## 2022-08-15 PROCEDURE — 36415 COLL VENOUS BLD VENIPUNCTURE: CPT

## 2022-08-17 ENCOUNTER — TELEPHONE (OUTPATIENT)
Dept: MEDICAL GROUP | Facility: MEDICAL CENTER | Age: 72
End: 2022-08-17
Payer: MEDICARE

## 2022-08-17 NOTE — TELEPHONE ENCOUNTER
ESTABLISHED PATIENT PRE-VISIT PLANNING     Patient was NOT contacted to complete PVP.     Note: Patient will not be contacted if there is no indication to call.     1.  Reviewed notes from the last few office visits within the medical group: Yes    2.  If any orders were placed at last visit or intended to be done for this visit (i.e. 6 mos follow-up), do we have Results/Consult Notes?           Labs - Labs ordered, completed on 08/15/2022 and results are in chart.  Note: If patient appointment is for lab review and patient did not complete labs, check with provider if OK to reschedule patient until labs completed.         Imaging - Imaging was not ordered at last office visit.         Referrals - Referral ordered, patient has NOT been seen.   -Colonoscopy: Called and spoke to [facility]. Per their office patient hasn't been seen or scheduled since year 2017. Referral Status Authorized.     3. Is this appointment scheduled as a Hospital Follow-Up? No    4.  Immunizations were updated in Epic using Reconcile Outside Information activity? Yes    5.  Patient is due for the following Health Maintenance Topics:   Health Maintenance Due   Topic Date Due    IMM HEP B VACCINE (1 of 3 - 3-dose series) Never done    ABDOMINAL AORTIC ANEURYSM (AAA) SCREEN  Never done    COLORECTAL CANCER SCREENING  02/20/2018    IMM DTaP/Tdap/Td Vaccine (2 - Td or Tdap) 08/10/2022       6.  AHA (Pulse8) form printed for Provider? No, patient does not have any open alerts

## 2022-08-18 ENCOUNTER — OFFICE VISIT (OUTPATIENT)
Dept: MEDICAL GROUP | Facility: MEDICAL CENTER | Age: 72
End: 2022-08-18
Payer: MEDICARE

## 2022-08-18 VITALS
TEMPERATURE: 98 F | HEIGHT: 68 IN | OXYGEN SATURATION: 96 % | BODY MASS INDEX: 33.61 KG/M2 | WEIGHT: 221.78 LBS | SYSTOLIC BLOOD PRESSURE: 140 MMHG | DIASTOLIC BLOOD PRESSURE: 72 MMHG | RESPIRATION RATE: 16 BRPM | HEART RATE: 69 BPM

## 2022-08-18 DIAGNOSIS — L98.9 SKIN LESION: ICD-10-CM

## 2022-08-18 DIAGNOSIS — Z13.6 ENCOUNTER FOR ABDOMINAL AORTIC ANEURYSM (AAA) SCREENING: ICD-10-CM

## 2022-08-18 DIAGNOSIS — M10.9 GOUT, ARTHRITIS: ICD-10-CM

## 2022-08-18 DIAGNOSIS — E78.5 HYPERLIPIDEMIA, UNSPECIFIED HYPERLIPIDEMIA TYPE: ICD-10-CM

## 2022-08-18 DIAGNOSIS — I10 ESSENTIAL HYPERTENSION: ICD-10-CM

## 2022-08-18 DIAGNOSIS — Z12.11 COLON CANCER SCREENING: ICD-10-CM

## 2022-08-18 DIAGNOSIS — E66.9 OBESITY (BMI 30-39.9): ICD-10-CM

## 2022-08-18 PROCEDURE — 99214 OFFICE O/P EST MOD 30 MIN: CPT | Performed by: FAMILY MEDICINE

## 2022-08-18 RX ORDER — ROSUVASTATIN CALCIUM 10 MG/1
10 TABLET, COATED ORAL EVERY EVENING
Qty: 90 TABLET | Refills: 2 | Status: SHIPPED | OUTPATIENT
Start: 2022-08-18 | End: 2023-03-08

## 2022-08-18 ASSESSMENT — FIBROSIS 4 INDEX: FIB4 SCORE: 0.54

## 2022-08-18 NOTE — PROGRESS NOTES
CC: Hypertension, gout arthritis, obesity, hyperlipidemia, multiple skin lesions    HPI:   Giovanny presents today to discuss the following medical issues:    Essential hypertension  Blood pressure has been adequately controlled on valsartan 160 mg daily, triamterene/hydrochlorothiazide 37.5-25 mg daily, and amlodipine 5 mg daily.  No side effects    Gout, arthritis  Patient is currently asymptomatic has not had acute episodes for.  He is currently doing fine on allopurinol 300 mg daily    Obesity (BMI 30-39.9)  BMI is 33.7. The medical rationale for weight loss in obese individuals is that obesity is associated with a significant increase in mortality, and many health risks including type 2 diabetes mellitus, hypertension, dyslipidemia, and coronary heart disease. The benefits of weight loss include a reduction in the rate of progression from impaired glucose tolerance to diabetes, blood pressure in hypertensive patients, and lipid levels in higher risk patients. Other noncardiac benefits of weight loss include reductions in urinary incontinence, sleep apnea, and depression, and improvements in quality of life, physical functioning, and mobility.Recommend lifestyle modification: exercise 30 minutes per day 5 days per week. Recommend also portion control.    Hyperlipidemia, unspecified hyperlipidemia type  Last lipid panel showed   Cholesterol,Tot 100 - 199 mg/dL 191  187  173  166  169  136  188    Triglycerides 0 - 149 mg/dL 136  155 High   144  107  89  88  80    HDL >=40 mg/dL 55  51  36 Abnormal   60  60  39 Abnormal   58    LDL <100 mg/dL 109 High   105 High   108 High             ASCAD risk score is 24%.  Discussed starting statin, patient agreed to have statin.    Skin lesion  Has multiple skin lesions, has never been to dermatology.      Due for colonoscopy,   Patient smoked in the past, quit 25 years ago.  Discussed referral to abdominal aortic aneurysm screening, he agreed.    Patient Active Problem List     "Diagnosis Date Noted    Hyperlipidemia 08/18/2022    Obesity (BMI 30-39.9) 10/17/2017    Flu vaccine need 10/17/2017    Benign neoplasm of colon 04/04/2017    Need for Streptococcus pneumoniae vaccination 12/27/2016    Pure hypercholesterolemia 12/27/2016    Routine general medical examination at a health care facility 06/27/2016    Hyperglycemia 02/29/2016    Onychomycosis 02/29/2016    Gout, arthritis 11/18/2015    Obesity (BMI 30.0-34.9) 11/18/2015    Essential hypertension 11/18/2015       Current Outpatient Medications   Medication Sig Dispense Refill    valsartan (DIOVAN) 160 MG Tab TAKE 1 TAB BY MOUTH EVERY DAY . FOR BLOOD PRESSURE 100 Tablet 0    allopurinol (ZYLOPRIM) 300 MG Tab TAKE 1 TABLET BY MOUTH EVERY DAY 90 Tablet 3    triamterene-hctz (MAXZIDE-25/DYAZIDE) 37.5-25 MG Tab TAKE 1 TABLET BY MOUTH EVERY DAY 90 Tablet 3    amLODIPine (NORVASC) 5 MG Tab TAKE 1 TABLET BY MOUTH EVERY DAY 90 Tablet 3     No current facility-administered medications for this visit.         Allergies as of 08/18/2022    (No Known Allergies)        ROS: Denies any chest pain, Shortness of breath, Changes bowel or bladder, Lower extremity edema.    Physical Exam:  BP (!) 140/72 (BP Location: Right arm, Patient Position: Sitting, BP Cuff Size: Adult)   Pulse 69   Temp 36.7 °C (98 °F) (Temporal)   Resp 16   Ht 1.727 m (5' 8\")   Wt 101 kg (221 lb 12.5 oz)   SpO2 96%   BMI 33.72 kg/m²   Gen.: Well-developed, well-nourished, no apparent distress,pleasant and cooperative with the examination  Skin:  Warm and dry with good turgor.  Multiple suspicious scaly skin lesions  HEENT:Sinuses nontender with palpation, TMs clear, nares patent with pink mucosa and clear rhinorrhea,no septal deviation ,polyps or lesions. lips without lesions, oropharynx clear.  Neck: Trachea midline,no masses or adenopathy. No JVD.  Cor: Regular rate and rhythm without murmur, gallop or rub.  Lungs: Respirations unlabored.Clear to auscultation with equal " breath sounds bilaterally. No wheezes, rhonchi.  Extremities: No cyanosis, clubbing or edema.        Assessment and Plan.   72 y.o. male     1. Essential hypertension  Controlled.  Continue on valsartan 160 mg daily, triamterene/hydrochlorothiazide 37.5-25 mg daily, and amlodipine 5 mg daily    2. Gout, arthritis  Patient has been asymptomatic/stable  Continue on allopurinol 300 mg daily    3. Obesity (BMI 30-39.9)  BMI is 33.7  Patient is counseled on lifestyle modification    4. Hyperlipidemia, unspecified hyperlipidemia type  Last lipid panel showed high LDL.  ASCAD risk score is 24%.  Discussed starting statin, he agreed to have a statin.  No I will start patient on rosuvastatin 10 mg daily, side effects discussed.  Patient is counseled on lifestyle modification    - rosuvastatin (CRESTOR) 10 MG Tab; Take 1 Tablet by mouth every evening.  Dispense: 90 Tablet; Refill: 2    5. Colon cancer screening  Due for colonoscopy, order placed.    - Referral to GI for Colonoscopy    6. Skin lesion  Has multiple skin lesions, has never been to dermatology.  Referral placed    - Referral to Dermatology    7. Encounter for abdominal aortic aneurysm (AAA) screening  Patient smoked in the past, quit 25 years ago.  Discussed referral to abdominal aortic aneurysm screening, he agreed.    - US-ABDOMINAL AORTA W/O DOPPLER; Future

## 2022-09-15 ENCOUNTER — OFFICE VISIT (OUTPATIENT)
Dept: DERMATOLOGY | Facility: IMAGING CENTER | Age: 72
End: 2022-09-15
Payer: MEDICARE

## 2022-09-15 DIAGNOSIS — L81.4 LENTIGO: ICD-10-CM

## 2022-09-15 DIAGNOSIS — L82.1 SEBORRHEIC KERATOSIS: ICD-10-CM

## 2022-09-15 DIAGNOSIS — D49.2 NEOPLASM OF SKIN: ICD-10-CM

## 2022-09-15 PROCEDURE — 11102 TANGNTL BX SKIN SINGLE LES: CPT | Performed by: DERMATOLOGY

## 2022-09-15 PROCEDURE — 99213 OFFICE O/P EST LOW 20 MIN: CPT | Mod: 25 | Performed by: DERMATOLOGY

## 2022-09-19 RX ORDER — AMLODIPINE BESYLATE 5 MG/1
TABLET ORAL
Qty: 90 TABLET | Refills: 3 | Status: SHIPPED | OUTPATIENT
Start: 2022-09-19 | End: 2023-09-18

## 2022-09-22 ENCOUNTER — TELEPHONE (OUTPATIENT)
Dept: DERMATOLOGY | Facility: IMAGING CENTER | Age: 72
End: 2022-09-22
Payer: MEDICARE

## 2022-10-06 ENCOUNTER — OFFICE VISIT (OUTPATIENT)
Dept: DERMATOLOGY | Facility: IMAGING CENTER | Age: 72
End: 2022-10-06
Payer: MEDICARE

## 2022-10-06 VITALS
SYSTOLIC BLOOD PRESSURE: 134 MMHG | DIASTOLIC BLOOD PRESSURE: 78 MMHG | TEMPERATURE: 98.5 F | WEIGHT: 220 LBS | BODY MASS INDEX: 33.34 KG/M2 | HEIGHT: 68 IN

## 2022-10-06 DIAGNOSIS — D09.9 SQUAMOUS CELL CARCINOMA IN SITU (SCCIS): ICD-10-CM

## 2022-10-06 PROCEDURE — 11603 EXC TR-EXT MAL+MARG 2.1-3 CM: CPT | Performed by: DERMATOLOGY

## 2022-10-06 PROCEDURE — 12032 INTMD RPR S/A/T/EXT 2.6-7.5: CPT | Performed by: DERMATOLOGY

## 2022-10-06 ASSESSMENT — FIBROSIS 4 INDEX: FIB4 SCORE: 0.54

## 2022-10-06 NOTE — PROGRESS NOTES
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 Patient opted for wide local excision. Risks, benefits and alternatives of procedure, including, but not limited to scar, bleeding, pain, infection, nerve damage, recurrence of tumor, failed surgery, and need for further surgery, were discussed and written informed consent obtained. Correct site was verified by patient and myself, and verbal time out completed.     Allergies reviewed: Yes  Pacemaker/defibrillator: No  Artificial joints: No  Antibiotics given: No  Blood thinners/anticoagulants: No    Pre-op diagnosis:SCCIS (requisition number: see Replaced by [facility])  Post-op diagnosis: Same  Site:left arm  Pre-op size: 1.8X 1.3cm + 4mm margins = 2.6 X 2.1 cm  Antibiotics: no    There were no vitals taken for this visit.    Procedure: Area of surgery was prepped with alcohol, marked with 4mm margins, and with sterile marking pen. Anesthesia with 1% lidocaine with epinephrine administered with 30 gauge needle. The area was again cleaned with chlorhexidine swab. With sterile technique, a 15 blade scalpel was used to make a fusiform incision around the tumor to the level of the subcutaneous fat. The tumor was removed. Bleeding was minimal, and hemostasis was achieved with pressure, hyfrecation. Specimen was placed into biopsy container and sent to pathology by staff.    Intermediate Closure:  Buried vertical mattress sutures were placed with 4.0 monocryl to close dead space. 4.0 prolene superficial interrupted sutures were placed to approximate wound edges.  Vaseline applied to wound with bandage. Patient tolerated procedure well and there were no complications, blood loss was minimal.     Final wound size: 6cm    Bandage was placed with vaseline, telfa, gauze and  tape. Wound care was discussed with the patient, and written instructions were provided. Patient to return to clinic in 10-14 days for suture removal. Patient to call us if any problems or concerns with the procedure site arise prior to scheduled appointment.     Additional follow-up will be planned for 3-6 months for total skin exam    Rossy Pierre MD

## 2022-10-13 ENCOUNTER — TELEPHONE (OUTPATIENT)
Dept: DERMATOLOGY | Facility: IMAGING CENTER | Age: 72
End: 2022-10-13
Payer: MEDICARE

## 2022-10-13 NOTE — TELEPHONE ENCOUNTER
Patient notified of D- Path results left arm excision has been completley excised . Scanned in media tab

## 2022-10-16 RX ORDER — VALSARTAN 160 MG/1
TABLET ORAL
Qty: 100 TABLET | Refills: 0 | Status: SHIPPED | OUTPATIENT
Start: 2022-10-16 | End: 2023-01-26

## 2022-10-18 ENCOUNTER — NON-PROVIDER VISIT (OUTPATIENT)
Dept: DERMATOLOGY | Facility: IMAGING CENTER | Age: 72
End: 2022-10-18
Payer: MEDICARE

## 2022-10-18 NOTE — PROGRESS NOTES
Giovanny Pack is a 72 y.o. male here for a Non-Provider Visit for Suture Removal.    Sutures were placed by Dr. Pierre on date: 10/06/2022  Skin is healed: Yes  Provider notified if skin is not healed, or if there is redness, heat, pain, or drainage from incision: N\A  Sutures removed.   Mastisol and steristips are placed: Yes Steristips    Advised to use emollient (vaseline, aquaphor, etc.) as needed, avoid peroxide and antibiotic ointment to reduce irritation.     Path report has been reviewed by provider.  Path report has reviewed with patient.

## 2022-10-27 RX ORDER — ALLOPURINOL 300 MG/1
TABLET ORAL
Qty: 90 TABLET | Refills: 3 | Status: SHIPPED | OUTPATIENT
Start: 2022-10-27 | End: 2023-10-30

## 2022-10-28 ENCOUNTER — DOCUMENTATION (OUTPATIENT)
Dept: HEALTH INFORMATION MANAGEMENT | Facility: OTHER | Age: 72
End: 2022-10-28
Payer: MEDICARE

## 2022-11-11 RX ORDER — TRIAMTERENE AND HYDROCHLOROTHIAZIDE 37.5; 25 MG/1; MG/1
TABLET ORAL
Qty: 90 TABLET | Refills: 3 | Status: SHIPPED | OUTPATIENT
Start: 2022-11-11 | End: 2023-11-07

## 2023-01-02 ENCOUNTER — OFFICE VISIT (OUTPATIENT)
Dept: URGENT CARE | Facility: PHYSICIAN GROUP | Age: 73
End: 2023-01-02
Payer: MEDICARE

## 2023-01-02 VITALS
DIASTOLIC BLOOD PRESSURE: 70 MMHG | HEART RATE: 82 BPM | OXYGEN SATURATION: 94 % | BODY MASS INDEX: 32.74 KG/M2 | RESPIRATION RATE: 18 BRPM | TEMPERATURE: 99.9 F | WEIGHT: 216 LBS | SYSTOLIC BLOOD PRESSURE: 130 MMHG | HEIGHT: 68 IN

## 2023-01-02 DIAGNOSIS — S51.812A LACERATION OF SKIN OF LEFT FOREARM, INITIAL ENCOUNTER: ICD-10-CM

## 2023-01-02 PROCEDURE — 12002 RPR S/N/AX/GEN/TRNK2.6-7.5CM: CPT | Performed by: NURSE PRACTITIONER

## 2023-01-02 PROCEDURE — 90715 TDAP VACCINE 7 YRS/> IM: CPT | Performed by: NURSE PRACTITIONER

## 2023-01-02 PROCEDURE — 90471 IMMUNIZATION ADMIN: CPT | Performed by: NURSE PRACTITIONER

## 2023-01-02 ASSESSMENT — FIBROSIS 4 INDEX: FIB4 SCORE: 0.54

## 2023-01-03 NOTE — PROGRESS NOTES
Subjective:     Giovanny Pack is a 72 y.o. male who presents for Laceration (Tonight cut Lft arm on  knife)      Kitchen knife. No numbness. Good drip.       Laceration to volar aspect of left forearm, through adipose. Exposed fascia. Linear. 4.5 cm    Laceration       Past Medical History:   Diagnosis Date    Colon neoplasm     Dental disorder     lower dentures    Gout 2015    Hepatitis A     Hyperglycemia 2016    Hypertension     Jaundice     Onychomycosis 2016    Routine general medical examination at a health care facility 2016       Past Surgical History:   Procedure Laterality Date    LOW ANTERIOR RESECTION ROBOTIC XI  2017    Procedure: LOW ANTERIOR RESECTION ROBOTIC XI;  Surgeon: Grayson Chan M.D.;  Location: SURGERY [facility];  Service:     ABDOMINAL EXPLORATION  2017    large polyp removed    OTHER ABDOMINAL SURGERY      hernia    RHINOPLASTY         Social History     Socioeconomic History    Marital status: Single     Spouse name: Not on file    Number of children: Not on file    Years of education: Not on file    Highest education level: Some college, no degree   Occupational History    Not on file   Tobacco Use    Smoking status: Former     Packs/day: 2.50     Years: 25.00     Pack years: 62.50     Types: Cigarettes     Quit date: 1996     Years since quittin.6    Smokeless tobacco: Never    Tobacco comments:     Started smoking at age 21 smoked off and on for 45 years   Vaping Use    Vaping Use: Never used   Substance and Sexual Activity    Alcohol use: Yes     Alcohol/week: 18.0 - 21.0 oz     Types: 30 - 35 Glasses of wine per week    Drug use: No    Sexual activity: Never     Partners: Female   Other Topics Concern    Not on file   Social History Narrative    Not on file     Social Determinants of Health     Financial Resource Strain: Not on file   Food Insecurity: Not on file   Transportation Needs: Not on file   Physical  "Activity: Not on file   Stress: Not on file   Social Connections: Not on file   Intimate Partner Violence: Not on file   Housing Stability: Not on file        Family History   Problem Relation Age of Onset    Hypertension Mother         HO    No Known Problems Sister     No Known Problems Sister     Diabetes Maternal Grandmother     No Known Problems Maternal Grandfather     Dementia Paternal Grandmother     No Known Problems Father         No Known Allergies    ROS     Objective:   /70 (BP Location: Right arm, Patient Position: Sitting, BP Cuff Size: Adult)   Pulse 82   Temp 37.7 °C (99.9 °F) (Temporal)   Resp 18   Ht 1.727 m (5' 8\")   Wt 98 kg (216 lb)   SpO2 94%   BMI 32.84 kg/m²     Physical Exam  Vitals reviewed.   Constitutional:       General: He is not in acute distress.     Appearance: He is not toxic-appearing.   Pulmonary:      Effort: Pulmonary effort is normal.   Musculoskeletal:         General: Tenderness and signs of injury present. No deformity. Normal range of motion.      Left forearm: Laceration and tenderness present. No deformity or bony tenderness.      Left wrist: Normal. Normal pulse.      Left hand: Normal. There is no disruption of two-point discrimination. Normal capillary refill.      Comments: Linear. 4.5 cm laceration to volar aspect of left forearm, through adipose. Exposed fascia/muscle, no injury. Distal neurovascular intact.    Skin:     General: Skin is warm and dry.      Findings: Bruising present.   Neurological:      Mental Status: He is alert and oriented to person, place, and time.       Assessment/Plan:   1. Laceration of skin of left forearm, initial encounter  - Tdap =>9yo IM    -NSAID's (ibuprofen) and tylenol as directed for pain and inflammation.   -RICE Therapy: Rest, Ice, Compression, Elevation  -Keep initial dressing in place for 24 hours, after which time most wounds can be opened to air.   -Gently clean area with mild soap and water. Can shower. Do not " soak wound in water (dishwater, swimming pool, lake or other bodies of water).  -An antibiotic ointment can be applied to the wound twice daily.  -Follow up in #7-10 days for suture removal.     Follow up sooner for signs of infection (redness, red streaking, pus, foul odor, severe pain, increased swelling or fever) or any other concerns. Follow up emergently for severe uncontrolled pain, neurovascular compromise (decreased sensation, motion, or circulation).    Procedure: Laceration Repair  -Risks including bleeding, nerve damage, infection, and poor cosmetic outcome discussed. Benefits and alternatives discussed.   -Clean technique with sterile instruments and suture used  -Local anesthesia with 2% lidocaine  -Closed with #8  -5.0 Nylon interrupted sutures with good wound approximation  -Polysporin and dressing placed  -Patient tolerated well  -Tetanus vaccination status reviewed: Td vaccination indicated and given today.    Differential diagnosis, natural history, supportive care, and indications for immediate follow-up discussed.

## 2023-01-03 NOTE — PATIENT INSTRUCTIONS
-NSAID's (ibuprofen) and tylenol as directed for pain and inflammation.   -RICE Therapy: Rest, Ice, Compression, Elevation  -Keep initial dressing in place for 24 hours, after which time most wounds can be opened to air.   -Gently clean area with mild soap and water. Can shower. Do not soak wound in water (dishwater, swimming pool, lake or other bodies of water).  -An antibiotic ointment can be applied to the wound twice daily.  -Follow up in #7 days for suture removal.     Follow up sooner for signs of infection (redness, red streaking, pus, foul odor, severe pain, increased swelling or fever) or any other concerns. Follow up emergently for severe uncontrolled pain, neurovascular compromise (decreased sensation, motion, or circulation).

## 2023-01-13 ENCOUNTER — OFFICE VISIT (OUTPATIENT)
Dept: URGENT CARE | Facility: PHYSICIAN GROUP | Age: 73
End: 2023-01-13
Payer: MEDICARE

## 2023-01-13 VITALS
TEMPERATURE: 97.7 F | HEART RATE: 76 BPM | DIASTOLIC BLOOD PRESSURE: 76 MMHG | RESPIRATION RATE: 16 BRPM | BODY MASS INDEX: 33.22 KG/M2 | WEIGHT: 219.2 LBS | OXYGEN SATURATION: 97 % | HEIGHT: 68 IN | SYSTOLIC BLOOD PRESSURE: 122 MMHG

## 2023-01-13 DIAGNOSIS — Z48.02 VISIT FOR SUTURE REMOVAL: ICD-10-CM

## 2023-01-13 PROCEDURE — 99211 OFF/OP EST MAY X REQ PHY/QHP: CPT

## 2023-01-13 ASSESSMENT — FIBROSIS 4 INDEX: FIB4 SCORE: 0.54

## 2023-01-13 NOTE — PROGRESS NOTES
Subjective:   Giovanny Pack is a 72 y.o. male who presents for Suture / Staple Removal (RFA, 1/2/23)      HPI:    Patient presents to urgent care to have 8 sutures removed which were placed on 01/02/23. Patient states the wound stopped draining serous fluid yesterday. Denies tenderness, warmth, redness. Denies fever, chills, night sweats, nausea, vomiting, diarrhea.     ROS As above in HPI    Medications:    Current Outpatient Medications on File Prior to Visit   Medication Sig Dispense Refill    triamterene-hctz (MAXZIDE-25/DYAZIDE) 37.5-25 MG Tab TAKE 1 TABLET BY MOUTH EVERY DAY 90 Tablet 3    allopurinol (ZYLOPRIM) 300 MG Tab TAKE 1 TABLET BY MOUTH EVERY DAY 90 Tablet 3    valsartan (DIOVAN) 160 MG Tab TAKE 1 TAB BY MOUTH EVERY DAY . FOR BLOOD PRESSURE 100 Tablet 0    amLODIPine (NORVASC) 5 MG Tab TAKE 1 TABLET BY MOUTH EVERY DAY 90 Tablet 3    rosuvastatin (CRESTOR) 10 MG Tab Take 1 Tablet by mouth every evening. 90 Tablet 2     No current facility-administered medications on file prior to visit.        Allergies:   Patient has no known allergies.    Problem List:   Patient Active Problem List   Diagnosis    Gout, arthritis    Obesity (BMI 30.0-34.9)    Essential hypertension    Hyperglycemia    Onychomycosis    Routine general medical examination at a health care facility    Need for Streptococcus pneumoniae vaccination    Pure hypercholesterolemia    Benign neoplasm of colon    Obesity (BMI 30-39.9)    Flu vaccine need    Hyperlipidemia        Surgical History:  Past Surgical History:   Procedure Laterality Date    LOW ANTERIOR RESECTION ROBOTIC XI  4/4/2017    Procedure: LOW ANTERIOR RESECTION ROBOTIC XI;  Surgeon: Grayson Chan M.D.;  Location: SURGERY [facility];  Service:     ABDOMINAL EXPLORATION  04/2017    large polyp removed    OTHER ABDOMINAL SURGERY      hernia    RHINOPLASTY         Past Social Hx:   Social History     Tobacco Use    Smoking status: Former     Packs/day: 2.50     Years:  "25.00     Pack years: 62.50     Types: Cigarettes     Quit date: 1996     Years since quittin.6    Smokeless tobacco: Never    Tobacco comments:     Started smoking at age 21 smoked off and on for 45 years   Vaping Use    Vaping Use: Never used   Substance Use Topics    Alcohol use: Yes     Alcohol/week: 18.0 - 21.0 oz     Types: 30 - 35 Glasses of wine per week    Drug use: No          Problem list, medications, and allergies reviewed by myself today in Epic.     Objective:     /76   Pulse 76   Temp 36.5 °C (97.7 °F) (Temporal)   Resp 16   Ht 1.727 m (5' 8\")   Wt 99.4 kg (219 lb 3.2 oz)   SpO2 97%   BMI 33.33 kg/m²     Physical Exam  Vitals and nursing note reviewed.   Constitutional:       General: He is not in acute distress.     Appearance: Normal appearance. He is not ill-appearing or diaphoretic.   HENT:      Head: Normocephalic and atraumatic.   Eyes:      Conjunctiva/sclera: Conjunctivae normal.   Cardiovascular:      Rate and Rhythm: Normal rate and regular rhythm.      Heart sounds: Normal heart sounds.   Pulmonary:      Effort: Pulmonary effort is normal.      Breath sounds: Normal breath sounds.   Abdominal:      Tenderness: There is no abdominal tenderness.   Skin:     General: Skin is warm and dry.      Capillary Refill: Capillary refill takes less than 2 seconds.      Findings: No rash.      Comments: Left forearm laceration has well approximated margins with 8 sutures in place. No TTP, edema, drainage. Slight periwound erythema.    Neurological:      Mental Status: He is alert and oriented to person, place, and time.       Assessment/Plan:     Diagnosis and associated orders:   1. Visit for suture removal  - Suture Removal        Comments/MDM:     8 sutures were removed without complication.  Wound dressed with polysporin, 4x4, coban.  Patient to continue with home wound instructions provided during initial visit.   Follow up with PCP.       Pt is clinically stable at today's " acute urgent care visit.  No acute distress noted. Appropriate for outpatient management at this time.       Discussed DDx, management options (risks,benefits, and alternatives to planned treatment), natural progression and supportive care.  Expressed understanding and the treatment plan was agreed upon. Questions were encouraged and answered   Return to urgent care prn if new or worsening sx or if there is no improvement in condition prn.    Educated in Red flags and indications to immediately call 911 or present to the Emergency Department.   Advised the patient to follow-up with the primary care physician for recheck, reevaluation, and consideration of further management.      Please note that this dictation was created using voice recognition software. I have made a reasonable attempt to correct obvious errors, but I expect that there are errors of grammar and possibly content that I did not discover before finalizing the note.    This note was electronically signed by Cici Arana DNP

## 2023-01-26 RX ORDER — VALSARTAN 160 MG/1
TABLET ORAL
Qty: 100 TABLET | Refills: 0 | Status: SHIPPED | OUTPATIENT
Start: 2023-01-26 | End: 2023-05-09

## 2023-02-15 ENCOUNTER — TELEPHONE (OUTPATIENT)
Dept: MEDICAL GROUP | Facility: MEDICAL CENTER | Age: 73
End: 2023-02-15
Payer: MEDICARE

## 2023-02-15 DIAGNOSIS — I10 ESSENTIAL HYPERTENSION: ICD-10-CM

## 2023-02-15 NOTE — TELEPHONE ENCOUNTER
VOICEMAIL  1. Caller Name: Giovanny Pack                      Call Back Number: [phone]    2. Message: Giovanny JORGE stating that he went to the lab to get bloodwork done, but his order was refused.     3. Patient approves office to leave a detailed voicemail/MyChart message: yes

## 2023-02-15 NOTE — TELEPHONE ENCOUNTER
I do not know why his order was refused.  However I will look to his records identified any blood work ordered, so I do not know who order blood work for him  So I placed order for complete blood work and send it to the lab.  [facility]

## 2023-02-16 ENCOUNTER — HOSPITAL ENCOUNTER (OUTPATIENT)
Dept: LAB | Facility: MEDICAL CENTER | Age: 73
End: 2023-02-16
Attending: FAMILY MEDICINE
Payer: MEDICARE

## 2023-02-16 DIAGNOSIS — I10 ESSENTIAL HYPERTENSION: ICD-10-CM

## 2023-02-16 LAB
ALBUMIN SERPL BCP-MCNC: 4.5 G/DL (ref 3.2–4.9)
ALBUMIN/GLOB SERPL: 1.4 G/DL
ALP SERPL-CCNC: 81 U/L (ref 30–99)
ALT SERPL-CCNC: 23 U/L (ref 2–50)
ANION GAP SERPL CALC-SCNC: 11 MMOL/L (ref 7–16)
AST SERPL-CCNC: 18 U/L (ref 12–45)
BASOPHILS # BLD AUTO: 0.8 % (ref 0–1.8)
BASOPHILS # BLD: 0.06 K/UL (ref 0–0.12)
BILIRUB SERPL-MCNC: 0.4 MG/DL (ref 0.1–1.5)
BUN SERPL-MCNC: 15 MG/DL (ref 8–22)
CALCIUM ALBUM COR SERPL-MCNC: 9.4 MG/DL (ref 8.5–10.5)
CALCIUM SERPL-MCNC: 9.8 MG/DL (ref 8.5–10.5)
CHLORIDE SERPL-SCNC: 101 MMOL/L (ref 96–112)
CHOLEST SERPL-MCNC: 111 MG/DL (ref 100–199)
CO2 SERPL-SCNC: 26 MMOL/L (ref 20–33)
CREAT SERPL-MCNC: 0.77 MG/DL (ref 0.5–1.4)
EOSINOPHIL # BLD AUTO: 0.15 K/UL (ref 0–0.51)
EOSINOPHIL NFR BLD: 1.9 % (ref 0–6.9)
ERYTHROCYTE [DISTWIDTH] IN BLOOD BY AUTOMATED COUNT: 45.5 FL (ref 35.9–50)
FASTING STATUS PATIENT QL REPORTED: NORMAL
GFR SERPLBLD CREATININE-BSD FMLA CKD-EPI: 95 ML/MIN/1.73 M 2
GLOBULIN SER CALC-MCNC: 3.3 G/DL (ref 1.9–3.5)
GLUCOSE SERPL-MCNC: 102 MG/DL (ref 65–99)
HCT VFR BLD AUTO: 47.4 % (ref 42–52)
HDLC SERPL-MCNC: 38 MG/DL
HGB BLD-MCNC: 15.8 G/DL (ref 14–18)
IMM GRANULOCYTES # BLD AUTO: 0.02 K/UL (ref 0–0.11)
IMM GRANULOCYTES NFR BLD AUTO: 0.3 % (ref 0–0.9)
LDLC SERPL CALC-MCNC: 52 MG/DL
LYMPHOCYTES # BLD AUTO: 1.59 K/UL (ref 1–4.8)
LYMPHOCYTES NFR BLD: 20.1 % (ref 22–41)
MCH RBC QN AUTO: 30.9 PG (ref 27–33)
MCHC RBC AUTO-ENTMCNC: 33.3 G/DL (ref 33.7–35.3)
MCV RBC AUTO: 92.8 FL (ref 81.4–97.8)
MONOCYTES # BLD AUTO: 0.8 K/UL (ref 0–0.85)
MONOCYTES NFR BLD AUTO: 10.1 % (ref 0–13.4)
NEUTROPHILS # BLD AUTO: 5.29 K/UL (ref 1.82–7.42)
NEUTROPHILS NFR BLD: 66.8 % (ref 44–72)
NRBC # BLD AUTO: 0 K/UL
NRBC BLD-RTO: 0 /100 WBC
PLATELET # BLD AUTO: 363 K/UL (ref 164–446)
PMV BLD AUTO: 9.3 FL (ref 9–12.9)
POTASSIUM SERPL-SCNC: 3.8 MMOL/L (ref 3.6–5.5)
PROT SERPL-MCNC: 7.8 G/DL (ref 6–8.2)
RBC # BLD AUTO: 5.11 M/UL (ref 4.7–6.1)
SODIUM SERPL-SCNC: 138 MMOL/L (ref 135–145)
TRIGL SERPL-MCNC: 105 MG/DL (ref 0–149)
TSH SERPL DL<=0.005 MIU/L-ACNC: 2.83 UIU/ML (ref 0.38–5.33)
WBC # BLD AUTO: 7.9 K/UL (ref 4.8–10.8)

## 2023-02-16 PROCEDURE — 84443 ASSAY THYROID STIM HORMONE: CPT

## 2023-02-16 PROCEDURE — 36415 COLL VENOUS BLD VENIPUNCTURE: CPT

## 2023-02-16 PROCEDURE — 85025 COMPLETE CBC W/AUTO DIFF WBC: CPT

## 2023-02-16 PROCEDURE — 80053 COMPREHEN METABOLIC PANEL: CPT

## 2023-02-16 PROCEDURE — 80061 LIPID PANEL: CPT

## 2023-02-17 ENCOUNTER — OFFICE VISIT (OUTPATIENT)
Dept: MEDICAL GROUP | Facility: MEDICAL CENTER | Age: 73
End: 2023-02-17
Payer: MEDICARE

## 2023-02-17 VITALS
SYSTOLIC BLOOD PRESSURE: 120 MMHG | TEMPERATURE: 97.8 F | HEART RATE: 72 BPM | BODY MASS INDEX: 32.68 KG/M2 | RESPIRATION RATE: 16 BRPM | OXYGEN SATURATION: 96 % | WEIGHT: 215.61 LBS | DIASTOLIC BLOOD PRESSURE: 60 MMHG | HEIGHT: 68 IN

## 2023-02-17 DIAGNOSIS — Z12.11 COLON CANCER SCREENING: ICD-10-CM

## 2023-02-17 DIAGNOSIS — E78.5 HYPERLIPIDEMIA, UNSPECIFIED HYPERLIPIDEMIA TYPE: ICD-10-CM

## 2023-02-17 DIAGNOSIS — I10 ESSENTIAL HYPERTENSION: ICD-10-CM

## 2023-02-17 DIAGNOSIS — M10.9 GOUT, ARTHRITIS: ICD-10-CM

## 2023-02-17 PROCEDURE — 99214 OFFICE O/P EST MOD 30 MIN: CPT | Performed by: FAMILY MEDICINE

## 2023-02-17 ASSESSMENT — FIBROSIS 4 INDEX: FIB4 SCORE: 0.73

## 2023-02-17 ASSESSMENT — PATIENT HEALTH QUESTIONNAIRE - PHQ9: CLINICAL INTERPRETATION OF PHQ2 SCORE: 0

## 2023-02-17 NOTE — PROGRESS NOTES
CC: Hypertension, hyperlipidemia, gout arthritis, impaired glucose tolerance    HPI:   Giovanny presents today to discuss the following:    Essential hypertension.  Blood pressure has been adequate controlled on valsartan 160 mg daily, and triamterene/hydrochlorothiazide 37.5-25 mg daily.  No side effects    Hyperlipidemia, unspecified hyperlipidemia type  He has been tolerating the statin. Denies muscle pain LFTs has been normal.  Most recent lipid panel showed that the LDL is back to normal.  Patient has been on rosuvastatin 10 mg daily    Gout, arthritis  Patient has been doing fine on allopurinol 300 mg daily, no acute episodes for a while.    Impaired glucose tolerance  Asymptomatic, denies polyuria and polydipsia.  Blood glucose has been slightly above 100.  No history of diabetes.  Has not been on medication    Due for colonoscopy, patient had partial colectomy due to large polyp in 2017, advised to repeat colonoscopy in 1 to 3 years but he did not.        Patient Active Problem List    Diagnosis Date Noted    Hyperlipidemia 08/18/2022    Obesity (BMI 30-39.9) 10/17/2017    Flu vaccine need 10/17/2017    Benign neoplasm of colon 04/04/2017    Need for Streptococcus pneumoniae vaccination 12/27/2016    Pure hypercholesterolemia 12/27/2016    Routine general medical examination at a health care facility 06/27/2016    Hyperglycemia 02/29/2016    Onychomycosis 02/29/2016    Gout, arthritis 11/18/2015    Obesity (BMI 30.0-34.9) 11/18/2015    Essential hypertension 11/18/2015       Current Outpatient Medications   Medication Sig Dispense Refill    valsartan (DIOVAN) 160 MG Tab TAKE 1 TABLET BY MOUTH EVERY DAY . FOR BLOOD PRESSURE 100 Tablet 0    triamterene-hctz (MAXZIDE-25/DYAZIDE) 37.5-25 MG Tab TAKE 1 TABLET BY MOUTH EVERY DAY 90 Tablet 3    allopurinol (ZYLOPRIM) 300 MG Tab TAKE 1 TABLET BY MOUTH EVERY DAY 90 Tablet 3    amLODIPine (NORVASC) 5 MG Tab TAKE 1 TABLET BY MOUTH EVERY DAY 90 Tablet 3    rosuvastatin  "(CRESTOR) 10 MG Tab Take 1 Tablet by mouth every evening. 90 Tablet 2     No current facility-administered medications for this visit.         Allergies as of 02/17/2023    (No Known Allergies)        ROS: Denies any chest pain, Shortness of breath, Changes bowel or bladder, Lower extremity edema.    Physical Exam:  /60 (BP Location: Right arm, Patient Position: Sitting, BP Cuff Size: Adult)   Pulse 72   Temp 36.6 °C (97.8 °F) (Temporal)   Resp 16   Ht 1.727 m (5' 8\")   Wt 97.8 kg (215 lb 9.8 oz)   SpO2 96%   BMI 32.78 kg/m²   Gen.: Well-developed, well-nourished, no apparent distress,pleasant and cooperative with the examination  Skin:  Warm and dry with good turgor. No rashes or suspicious lesions in visible areas  HEENT:Sinuses nontender with palpation, TMs clear, nares patent with pink mucosa and clear rhinorrhea,no septal deviation ,polyps or lesions. lips without lesions, oropharynx clear.  Neck: Trachea midline,no masses or adenopathy. No JVD.  Cor: Regular rate and rhythm without murmur, gallop or rub.  Lungs: Respirations unlabored.Clear to auscultation with equal breath sounds bilaterally. No wheezes, rhonchi.  Extremities: No cyanosis, clubbing or edema.    Assessment and Plan.   72 y.o. male     1. Essential hypertension  Controlled.  Continue on valsartan 160 mg daily, and triamterene/hydrochlorothiazide 37.5-25 mg daily    2. Hyperlipidemia, unspecified hyperlipidemia type  He has been tolerating the statin. Denies muscle pain LFTs has been normal.  Most recent lipid panel showed that the LDL is back to normal.   Continue on rosuvastatin 10 mg daily    3. Gout, arthritis  Patient has been doing fine on allopurinol 300 mg daily    4. Impaired glucose tolerance  Blood glucose has been slightly above 100.  No history of diabetes  Patient is counseled on lifestyle modification  We will continue monitor blood glucose    4. Colon cancer screening  Due for colonoscopy, patient had partial " colectomy due to large polyp in 2017, advised to repeat colonoscopy in 1 to 3 years but he did not.  Order for colonoscopy placed  - Referral to GI for Colonoscopy

## 2023-03-08 DIAGNOSIS — E78.5 HYPERLIPIDEMIA, UNSPECIFIED HYPERLIPIDEMIA TYPE: ICD-10-CM

## 2023-03-08 RX ORDER — ROSUVASTATIN CALCIUM 10 MG/1
TABLET, COATED ORAL
Qty: 100 TABLET | Refills: 2 | Status: SHIPPED | OUTPATIENT
Start: 2023-03-08 | End: 2024-01-09

## 2023-05-09 RX ORDER — VALSARTAN 160 MG/1
TABLET ORAL
Qty: 100 TABLET | Refills: 0 | Status: SHIPPED | OUTPATIENT
Start: 2023-05-09 | End: 2023-08-18

## 2023-06-22 ENCOUNTER — TELEPHONE (OUTPATIENT)
Dept: HEALTH INFORMATION MANAGEMENT | Facility: OTHER | Age: 73
End: 2023-06-22

## 2023-08-15 ENCOUNTER — APPOINTMENT (OUTPATIENT)
Dept: LAB | Facility: MEDICAL CENTER | Age: 73
End: 2023-08-15
Payer: MEDICARE

## 2023-08-15 ENCOUNTER — HOSPITAL ENCOUNTER (OUTPATIENT)
Dept: RADIOLOGY | Facility: MEDICAL CENTER | Age: 73
End: 2023-08-15
Attending: FAMILY MEDICINE
Payer: MEDICARE

## 2023-08-15 DIAGNOSIS — Z13.6 ENCOUNTER FOR ABDOMINAL AORTIC ANEURYSM (AAA) SCREENING: ICD-10-CM

## 2023-08-15 PROCEDURE — 76775 US EXAM ABDO BACK WALL LIM: CPT

## 2023-08-17 ENCOUNTER — OFFICE VISIT (OUTPATIENT)
Dept: MEDICAL GROUP | Facility: MEDICAL CENTER | Age: 73
End: 2023-08-17
Payer: MEDICARE

## 2023-08-17 VITALS
HEART RATE: 70 BPM | HEIGHT: 68 IN | TEMPERATURE: 97.4 F | BODY MASS INDEX: 34.08 KG/M2 | SYSTOLIC BLOOD PRESSURE: 120 MMHG | OXYGEN SATURATION: 90 % | DIASTOLIC BLOOD PRESSURE: 66 MMHG | WEIGHT: 224.87 LBS

## 2023-08-17 DIAGNOSIS — E78.5 HYPERLIPIDEMIA, UNSPECIFIED HYPERLIPIDEMIA TYPE: ICD-10-CM

## 2023-08-17 DIAGNOSIS — M10.9 GOUT, ARTHRITIS: ICD-10-CM

## 2023-08-17 DIAGNOSIS — E66.9 OBESITY (BMI 30-39.9): ICD-10-CM

## 2023-08-17 DIAGNOSIS — Z12.11 COLON CANCER SCREENING: ICD-10-CM

## 2023-08-17 DIAGNOSIS — I10 ESSENTIAL HYPERTENSION: ICD-10-CM

## 2023-08-17 PROCEDURE — 99214 OFFICE O/P EST MOD 30 MIN: CPT | Performed by: FAMILY MEDICINE

## 2023-08-17 PROCEDURE — 3074F SYST BP LT 130 MM HG: CPT | Performed by: FAMILY MEDICINE

## 2023-08-17 PROCEDURE — 3078F DIAST BP <80 MM HG: CPT | Performed by: FAMILY MEDICINE

## 2023-08-17 ASSESSMENT — FIBROSIS 4 INDEX: FIB4 SCORE: 0.74

## 2023-08-17 NOTE — PROGRESS NOTES
CC: Hypertension, hyperlipidemia, gout arthritis, obesity    HPI:   Giovanny presents today to discuss the following:    Essential hypertension  Blood pressure has been adequate controlled on valsartan 160 mg daily, and triamterene-hydrochlorothiazide 37.5-25 mg daily.  No side effects    Hyperlipidemia, unspecified hyperlipidemia type  He has been tolerating the statin. Denies muscle pain LFTs has been normal.  Patient has been on rosuvastatin 10 mg daily.    Gout, arthritis  Patient has been stable and asymptomatic.  Last acute episode was a while ago.  He has been doing fine on allopurinol 300 mg daily.  No side effects.      Obesity (BMI 30-39.9)  BMI is 34.1.The medical rationale for weight loss in obese individuals is that obesity is associated with a significant increase in mortality, and many health risks including type 2 diabetes mellitus, hypertension, dyslipidemia, and coronary heart disease. The benefits of weight loss include a reduction in the rate of progression from impaired glucose tolerance to diabetes, blood pressure in hypertensive patients, and lipid levels in higher risk patients. Other noncardiac benefits of weight loss include reductions in urinary incontinence, sleep apnea, and depression, and improvements in quality of life, physical functioning, and mobility.Recommend lifestyle modification: exercise 30 minutes per day 5 days per week. Recommend also portion control.+      Patient is due for colonoscopy,        Patient Active Problem List    Diagnosis Date Noted    Hyperlipidemia 08/18/2022    Obesity (BMI 30-39.9) 10/17/2017    Flu vaccine need 10/17/2017    Benign neoplasm of colon 04/04/2017    Need for Streptococcus pneumoniae vaccination 12/27/2016    Pure hypercholesterolemia 12/27/2016    Routine general medical examination at a health care facility 06/27/2016    Hyperglycemia 02/29/2016    Onychomycosis 02/29/2016    Gout, arthritis 11/18/2015    Obesity (BMI 30.0-34.9) 11/18/2015     "Essential hypertension 11/18/2015       Current Outpatient Medications   Medication Sig Dispense Refill    valsartan (DIOVAN) 160 MG Tab TAKE 1 TABLET BY MOUTH EVERY DAY . FOR BLOOD PRESSURE 100 Tablet 0    rosuvastatin (CRESTOR) 10 MG Tab TAKE 1 TABLET BY MOUTH EVERY DAY IN THE EVENING 100 Tablet 2    triamterene-hctz (MAXZIDE-25/DYAZIDE) 37.5-25 MG Tab TAKE 1 TABLET BY MOUTH EVERY DAY 90 Tablet 3    allopurinol (ZYLOPRIM) 300 MG Tab TAKE 1 TABLET BY MOUTH EVERY DAY 90 Tablet 3    amLODIPine (NORVASC) 5 MG Tab TAKE 1 TABLET BY MOUTH EVERY DAY 90 Tablet 3     No current facility-administered medications for this visit.         Allergies as of 08/17/2023    (No Known Allergies)        ROS: Denies any chest pain, Shortness of breath, Changes bowel or bladder, Lower extremity edema.    Physical Exam:  /66 (BP Location: Right arm, Patient Position: Sitting, BP Cuff Size: Adult)   Pulse 70   Temp 36.3 °C (97.4 °F) (Temporal)   Ht 1.727 m (5' 8\")   Wt 102 kg (224 lb 13.9 oz)   SpO2 90%   BMI 34.19 kg/m²   Gen.: Obese, no apparent distress,pleasant and cooperative with the examination  Skin:  Warm and dry with good turgor. No rashes or suspicious lesions in visible areas  HEENT:Sinuses nontender with palpation, TMs clear, nares patent with pink mucosa and clear rhinorrhea,no septal deviation ,polyps or lesions. lips without lesions, oropharynx clear.  Neck: Trachea midline,no masses or adenopathy. No JVD.  Cor: Regular rate and rhythm without murmur, gallop or rub.  Lungs: Respirations unlabored.Clear to auscultation with equal breath sounds bilaterally. No wheezes, rhonchi.  Extremities: No cyanosis, clubbing or edema.          Assessment and Plan.   73 y.o. male     1. Essential hypertension  Controlled.  Continue valsartan 160 mg daily, and triamterene-hydrochlorothiazide 37.5-25 mg daily.    - CBC WITH DIFFERENTIAL; Future  - Comp Metabolic Panel; Future  - Lipid Profile; Future  - TSH; Future    2. " Hyperlipidemia, unspecified hyperlipidemia type  He has been tolerating the statin. Denies muscle pain LFTs has been normal  Continue rosuvastatin 10 mg daily.    - Lipid Profile; Future  - TSH; Future    3. Gout, arthritis  Patient has been doing fine on allopurinol 300 mg daily.  No acute episodes for a while.  - URIC ACID; Future    4. Colon cancer screening  Patient is due for colonoscopy, order placed    - Referral to GI for Colonoscopy    5. Obesity (BMI 30-39.9)  BMI is 34.1.  Patient is counseled on lifestyle modification.

## 2023-08-18 RX ORDER — VALSARTAN 160 MG/1
TABLET ORAL
Qty: 100 TABLET | Refills: 0 | Status: SHIPPED | OUTPATIENT
Start: 2023-08-18 | End: 2023-11-27

## 2023-09-18 RX ORDER — AMLODIPINE BESYLATE 5 MG/1
TABLET ORAL
Qty: 100 TABLET | Refills: 3 | Status: SHIPPED | OUTPATIENT
Start: 2023-09-18

## 2023-10-30 RX ORDER — ALLOPURINOL 300 MG/1
TABLET ORAL
Qty: 90 TABLET | Refills: 3 | Status: SHIPPED | OUTPATIENT
Start: 2023-10-30

## 2023-11-07 RX ORDER — TRIAMTERENE AND HYDROCHLOROTHIAZIDE 37.5; 25 MG/1; MG/1
TABLET ORAL
Qty: 90 TABLET | Refills: 3 | Status: SHIPPED | OUTPATIENT
Start: 2023-11-07

## 2023-11-27 RX ORDER — VALSARTAN 160 MG/1
TABLET ORAL
Qty: 100 TABLET | Refills: 0 | Status: SHIPPED | OUTPATIENT
Start: 2023-11-27 | End: 2024-03-05

## 2024-01-09 DIAGNOSIS — E78.5 HYPERLIPIDEMIA, UNSPECIFIED HYPERLIPIDEMIA TYPE: ICD-10-CM

## 2024-01-09 RX ORDER — ROSUVASTATIN CALCIUM 10 MG/1
TABLET, COATED ORAL
Qty: 100 TABLET | Refills: 2 | Status: SHIPPED | OUTPATIENT
Start: 2024-01-09

## 2024-02-21 ENCOUNTER — HOSPITAL ENCOUNTER (OUTPATIENT)
Dept: LAB | Facility: MEDICAL CENTER | Age: 74
End: 2024-02-21
Attending: FAMILY MEDICINE
Payer: MEDICARE

## 2024-02-21 DIAGNOSIS — I10 ESSENTIAL HYPERTENSION: ICD-10-CM

## 2024-02-21 DIAGNOSIS — E78.5 HYPERLIPIDEMIA, UNSPECIFIED HYPERLIPIDEMIA TYPE: ICD-10-CM

## 2024-02-21 DIAGNOSIS — M10.9 GOUT, ARTHRITIS: ICD-10-CM

## 2024-02-21 LAB
ALBUMIN SERPL BCP-MCNC: 4.2 G/DL (ref 3.2–4.9)
ALBUMIN/GLOB SERPL: 1.2 G/DL
ALP SERPL-CCNC: 76 U/L (ref 30–99)
ALT SERPL-CCNC: 30 U/L (ref 2–50)
ANION GAP SERPL CALC-SCNC: 12 MMOL/L (ref 7–16)
AST SERPL-CCNC: 24 U/L (ref 12–45)
BASOPHILS # BLD AUTO: 0.9 % (ref 0–1.8)
BASOPHILS # BLD: 0.07 K/UL (ref 0–0.12)
BILIRUB SERPL-MCNC: 0.4 MG/DL (ref 0.1–1.5)
BUN SERPL-MCNC: 12 MG/DL (ref 8–22)
CALCIUM ALBUM COR SERPL-MCNC: 9.1 MG/DL (ref 8.5–10.5)
CALCIUM SERPL-MCNC: 9.3 MG/DL (ref 8.5–10.5)
CHLORIDE SERPL-SCNC: 97 MMOL/L (ref 96–112)
CHOLEST SERPL-MCNC: 118 MG/DL (ref 100–199)
CO2 SERPL-SCNC: 26 MMOL/L (ref 20–33)
CREAT SERPL-MCNC: 0.76 MG/DL (ref 0.5–1.4)
EOSINOPHIL # BLD AUTO: 0.13 K/UL (ref 0–0.51)
EOSINOPHIL NFR BLD: 1.7 % (ref 0–6.9)
ERYTHROCYTE [DISTWIDTH] IN BLOOD BY AUTOMATED COUNT: 43.9 FL (ref 35.9–50)
FASTING STATUS PATIENT QL REPORTED: NORMAL
GFR SERPLBLD CREATININE-BSD FMLA CKD-EPI: 95 ML/MIN/1.73 M 2
GLOBULIN SER CALC-MCNC: 3.5 G/DL (ref 1.9–3.5)
GLUCOSE SERPL-MCNC: 125 MG/DL (ref 65–99)
HCT VFR BLD AUTO: 47.1 % (ref 42–52)
HDLC SERPL-MCNC: 41 MG/DL
HGB BLD-MCNC: 16.5 G/DL (ref 14–18)
IMM GRANULOCYTES # BLD AUTO: 0.02 K/UL (ref 0–0.11)
IMM GRANULOCYTES NFR BLD AUTO: 0.3 % (ref 0–0.9)
LDLC SERPL CALC-MCNC: 51 MG/DL
LYMPHOCYTES # BLD AUTO: 1.46 K/UL (ref 1–4.8)
LYMPHOCYTES NFR BLD: 19.5 % (ref 22–41)
MCH RBC QN AUTO: 31.3 PG (ref 27–33)
MCHC RBC AUTO-ENTMCNC: 35 G/DL (ref 32.3–36.5)
MCV RBC AUTO: 89.4 FL (ref 81.4–97.8)
MONOCYTES # BLD AUTO: 0.71 K/UL (ref 0–0.85)
MONOCYTES NFR BLD AUTO: 9.5 % (ref 0–13.4)
NEUTROPHILS # BLD AUTO: 5.1 K/UL (ref 1.82–7.42)
NEUTROPHILS NFR BLD: 68.1 % (ref 44–72)
NRBC # BLD AUTO: 0 K/UL
NRBC BLD-RTO: 0 /100 WBC (ref 0–0.2)
PLATELET # BLD AUTO: 368 K/UL (ref 164–446)
PMV BLD AUTO: 9.2 FL (ref 9–12.9)
POTASSIUM SERPL-SCNC: 4.1 MMOL/L (ref 3.6–5.5)
PROT SERPL-MCNC: 7.7 G/DL (ref 6–8.2)
RBC # BLD AUTO: 5.27 M/UL (ref 4.7–6.1)
SODIUM SERPL-SCNC: 135 MMOL/L (ref 135–145)
TRIGL SERPL-MCNC: 131 MG/DL (ref 0–149)
TSH SERPL DL<=0.005 MIU/L-ACNC: 3.87 UIU/ML (ref 0.38–5.33)
URATE SERPL-MCNC: 5.5 MG/DL (ref 2.5–8.3)
WBC # BLD AUTO: 7.5 K/UL (ref 4.8–10.8)

## 2024-02-21 PROCEDURE — 84443 ASSAY THYROID STIM HORMONE: CPT

## 2024-02-21 PROCEDURE — 80053 COMPREHEN METABOLIC PANEL: CPT

## 2024-02-21 PROCEDURE — 36415 COLL VENOUS BLD VENIPUNCTURE: CPT

## 2024-02-21 PROCEDURE — 80061 LIPID PANEL: CPT

## 2024-02-21 PROCEDURE — 85025 COMPLETE CBC W/AUTO DIFF WBC: CPT

## 2024-02-21 PROCEDURE — 84550 ASSAY OF BLOOD/URIC ACID: CPT

## 2024-02-23 ENCOUNTER — OFFICE VISIT (OUTPATIENT)
Dept: MEDICAL GROUP | Facility: MEDICAL CENTER | Age: 74
End: 2024-02-23
Payer: MEDICARE

## 2024-02-23 VITALS
TEMPERATURE: 98.2 F | HEART RATE: 68 BPM | DIASTOLIC BLOOD PRESSURE: 70 MMHG | WEIGHT: 225.31 LBS | BODY MASS INDEX: 34.15 KG/M2 | OXYGEN SATURATION: 95 % | SYSTOLIC BLOOD PRESSURE: 126 MMHG | HEIGHT: 68 IN

## 2024-02-23 DIAGNOSIS — E78.2 MIXED HYPERLIPIDEMIA: ICD-10-CM

## 2024-02-23 DIAGNOSIS — M10.9 GOUT, ARTHRITIS: ICD-10-CM

## 2024-02-23 DIAGNOSIS — Z12.11 COLON CANCER SCREENING: ICD-10-CM

## 2024-02-23 DIAGNOSIS — R73.02 IGT (IMPAIRED GLUCOSE TOLERANCE): ICD-10-CM

## 2024-02-23 DIAGNOSIS — I10 ESSENTIAL HYPERTENSION: ICD-10-CM

## 2024-02-23 PROCEDURE — 3074F SYST BP LT 130 MM HG: CPT | Performed by: FAMILY MEDICINE

## 2024-02-23 PROCEDURE — 99214 OFFICE O/P EST MOD 30 MIN: CPT | Performed by: FAMILY MEDICINE

## 2024-02-23 PROCEDURE — 3078F DIAST BP <80 MM HG: CPT | Performed by: FAMILY MEDICINE

## 2024-02-23 ASSESSMENT — PATIENT HEALTH QUESTIONNAIRE - PHQ9: CLINICAL INTERPRETATION OF PHQ2 SCORE: 0

## 2024-02-23 ASSESSMENT — FIBROSIS 4 INDEX: FIB4 SCORE: 0.86

## 2024-02-23 NOTE — PROGRESS NOTES
CC: Hypertension, hyperlipidemia, gout arthritis, impaired glucose tolerance    HPI:   Giovanny presents today to discuss the following:    Essential hypertension  Blood pressure has been adequate controlled on valsartan 160 mg daily, amlodipine 5 mg daily, triamterene-hydrochlorothiazide 37.5-25 mg daily.  No side effects    Mixed hyperlipidemia  He has been tolerating the statin. Denies muscle pain LFTs has been normal, Currently on rosuvastatin 10 mg daily    Gout, arthritis  Patient has no acute episodes for a while.  Doing fine on allopurinol 300 mg daily, no side effects    IGT (impaired glucose tolerance)  Patient blood glucose has been high, however no history of diabetes.  Denies any polyuria polydipsia.  Currently not on medication.      Patient Active Problem List    Diagnosis Date Noted    IGT (impaired glucose tolerance) 02/23/2024    Hyperlipidemia 08/18/2022    Obesity (BMI 30-39.9) 10/17/2017    Flu vaccine need 10/17/2017    Benign neoplasm of colon 04/04/2017    Need for Streptococcus pneumoniae vaccination 12/27/2016    Pure hypercholesterolemia 12/27/2016    Routine general medical examination at a health care facility 06/27/2016    Hyperglycemia 02/29/2016    Onychomycosis 02/29/2016    Gout, arthritis 11/18/2015    Obesity (BMI 30.0-34.9) 11/18/2015    Essential hypertension 11/18/2015       Current Outpatient Medications   Medication Sig Dispense Refill    rosuvastatin (CRESTOR) 10 MG Tab TAKE 1 TABLET BY MOUTH EVERY DAY IN THE EVENING 100 Tablet 2    valsartan (DIOVAN) 160 MG Tab TAKE 1 TABLET BY MOUTH EVERY DAY FOR BLOOD PRESSURE 100 Tablet 0    triamterene-hctz (MAXZIDE-25/DYAZIDE) 37.5-25 MG Tab TAKE 1 TABLET BY MOUTH EVERY DAY 90 Tablet 3    allopurinol (ZYLOPRIM) 300 MG Tab TAKE 1 TABLET BY MOUTH EVERY DAY 90 Tablet 3    amLODIPine (NORVASC) 5 MG Tab TAKE 1 TABLET BY MOUTH EVERY  Tablet 3     No current facility-administered medications for this visit.         Allergies as of  02/23/2024    (No Known Allergies)        ROS: Denies any chest pain, Shortness of breath, Changes bowel or bladder, Lower extremity edema.    Physical Exam:          Assessment and Plan.   73 y.o. male     1. Essential hypertension  Controlled.    Continue on valsartan 160 mg daily, amlodipine 5 mg daily, triamterene-hydrochlorothiazide 37.5-25 mg daily    2. Mixed hyperlipidemia  He has been tolerating the statin. Denies muscle pain LFTs has been normal  Continue on rosuvastatin 10 mg daily    3. Gout, arthritis  No acute episodes for a while.  Continue on allopurinol 300 mg daily    4. IGT (impaired glucose tolerance)  Patient blood glucose has been high, however no history of diabetes.  Denies any polyuria polydipsia  Patient is counseled on lifestyle modification.  Will continue to monitor blood glucose

## 2024-03-05 RX ORDER — VALSARTAN 160 MG/1
TABLET ORAL
Qty: 100 TABLET | Refills: 0 | Status: SHIPPED | OUTPATIENT
Start: 2024-03-05

## 2024-04-29 ENCOUNTER — TELEPHONE (OUTPATIENT)
Dept: HEALTH INFORMATION MANAGEMENT | Facility: OTHER | Age: 74
End: 2024-04-29

## 2024-06-11 RX ORDER — VALSARTAN 160 MG/1
TABLET ORAL
Qty: 100 TABLET | Refills: 0 | Status: SHIPPED | OUTPATIENT
Start: 2024-06-11

## 2024-08-23 ENCOUNTER — OFFICE VISIT (OUTPATIENT)
Dept: MEDICAL GROUP | Facility: MEDICAL CENTER | Age: 74
End: 2024-08-23
Payer: MEDICARE

## 2024-08-23 VITALS
HEART RATE: 78 BPM | BODY MASS INDEX: 34.31 KG/M2 | RESPIRATION RATE: 16 BRPM | DIASTOLIC BLOOD PRESSURE: 76 MMHG | WEIGHT: 226.41 LBS | SYSTOLIC BLOOD PRESSURE: 130 MMHG | OXYGEN SATURATION: 95 % | TEMPERATURE: 97.4 F | HEIGHT: 68 IN

## 2024-08-23 DIAGNOSIS — E78.2 MIXED HYPERLIPIDEMIA: ICD-10-CM

## 2024-08-23 DIAGNOSIS — R73.02 IGT (IMPAIRED GLUCOSE TOLERANCE): ICD-10-CM

## 2024-08-23 DIAGNOSIS — Z12.11 COLON CANCER SCREENING: ICD-10-CM

## 2024-08-23 DIAGNOSIS — I10 ESSENTIAL HYPERTENSION: ICD-10-CM

## 2024-08-23 DIAGNOSIS — M10.9 GOUT, ARTHRITIS: ICD-10-CM

## 2024-08-23 PROCEDURE — 3075F SYST BP GE 130 - 139MM HG: CPT | Performed by: FAMILY MEDICINE

## 2024-08-23 PROCEDURE — 99214 OFFICE O/P EST MOD 30 MIN: CPT | Performed by: FAMILY MEDICINE

## 2024-08-23 PROCEDURE — 3078F DIAST BP <80 MM HG: CPT | Performed by: FAMILY MEDICINE

## 2024-08-23 RX ORDER — ALLOPURINOL 100 MG/1
100 TABLET ORAL DAILY
Qty: 90 TABLET | Refills: 2 | Status: SHIPPED | OUTPATIENT
Start: 2024-08-23

## 2024-08-23 ASSESSMENT — FIBROSIS 4 INDEX: FIB4 SCORE: 0.87

## 2024-08-23 NOTE — PROGRESS NOTES
CC: Hypertension, hyperlipidemia, impaired glucose tolerance, gout arthritis    HPI:   Giovanny presents today to discuss the following:     Essential hypertension  Patient's blood pressure has been controlled on valsartan 160 mg daily, amlodipine 5 mg daily, triamterene-hydrochlorothiazide 37.5-25 mg daily.  No side effects    Mixed hyperlipidemia  He has been tolerating the statin. Denies muscle pain LFTs has been normal, currently on rosuvastatin 10 mg daily.    IGT (impaired glucose tolerance)  Blood glucose has been slightly above 100.  No history of diabetes.  Denies polyuria polydipsia.  No history of diabetes    Gout, arthritis  Patient has not had acute gout episodes for 15 years.  He is currently on allopurinol 300 mg, no side effects.    Patient due for colonoscopy      Patient Active Problem List    Diagnosis Date Noted    IGT (impaired glucose tolerance) 02/23/2024    Hyperlipidemia 08/18/2022    Obesity (BMI 30-39.9) 10/17/2017    Flu vaccine need 10/17/2017    Benign neoplasm of colon 04/04/2017    Need for Streptococcus pneumoniae vaccination 12/27/2016    Pure hypercholesterolemia 12/27/2016    Routine general medical examination at a health care facility 06/27/2016    Hyperglycemia 02/29/2016    Onychomycosis 02/29/2016    Gout, arthritis 11/18/2015    Obesity (BMI 30.0-34.9) 11/18/2015    Essential hypertension 11/18/2015       Current Outpatient Medications   Medication Sig Dispense Refill    allopurinol (ZYLOPRIM) 100 MG Tab Take 1 Tablet by mouth every day. 90 Tablet 2    valsartan (DIOVAN) 160 MG Tab TAKE 1 TABLET BY MOUTH EVERY DAY FOR BLOOD PRESSURE 100 Tablet 0    rosuvastatin (CRESTOR) 10 MG Tab TAKE 1 TABLET BY MOUTH EVERY DAY IN THE EVENING 100 Tablet 2    triamterene-hctz (MAXZIDE-25/DYAZIDE) 37.5-25 MG Tab TAKE 1 TABLET BY MOUTH EVERY DAY 90 Tablet 3    amLODIPine (NORVASC) 5 MG Tab TAKE 1 TABLET BY MOUTH EVERY  Tablet 3     No current facility-administered medications for this  "visit.         Allergies as of 08/23/2024    (No Known Allergies)        ROS: Denies any chest pain, Shortness of breath, Changes bowel or bladder, Lower extremity edema.    Physical Exam:  /76 (BP Location: Left arm, Patient Position: Sitting, BP Cuff Size: Large adult)   Pulse 78   Temp 36.3 °C (97.4 °F)   Resp 16   Ht 1.727 m (5' 8\")   Wt 103 kg (226 lb 6.6 oz)   SpO2 95%   BMI 34.43 kg/m²   Gen.: Well-developed, well-nourished, no apparent distress,pleasant and cooperative with the examination  Skin:  Warm and dry with good turgor. No rashes or suspicious lesions in visible areas  HEENT:Sinuses nontender with palpation, TMs clear, nares patent with pink mucosa and clear rhinorrhea,no septal deviation ,polyps or lesions. lips without lesions, oropharynx clear.  Neck: Trachea midline,no masses or adenopathy. No JVD.  Cor: Regular rate and rhythm without murmur, gallop or rub.  Lungs: Respirations unlabored.Clear to auscultation with equal breath sounds bilaterally. No wheezes, rhonchi.  Extremities: No cyanosis, clubbing or edema.  '        Assessment and Plan.   74 y.o. male     1. Essential hypertension  Controlled.  Continue on valsartan 160 mg daily, amlodipine 5 mg daily, triamterene-hydrochlorothiazide 37.5-25 mg daily.    - CBC WITH DIFFERENTIAL; Future  - Comp Metabolic Panel; Future  - Lipid Profile; Future  - TSH; Future    2. Mixed hyperlipidemia  He has been tolerating the statin. Denies muscle pain LFTs has been normal  Continue on rosuvastatin 10 mg daily.  - Lipid Profile; Future  - TSH; Future    3. IGT (impaired glucose tolerance)  Blood glucose has been slightly above 100.  No history of diabetes  Patient is counseled on lifestyle modification.  Continue monitor blood glucose    - HEMOGLOBIN A1C; Future    4. Colon cancer screening  Patient is due for colonoscopy, referral placed    - Referral to GI for Colonoscopy    5. Gout, arthritis  Patient has not had any gout episodes for 15 " years.  Currently on allopurinol 300 mg, will decrease the dose to 100 mg.    - allopurinol (ZYLOPRIM) 100 MG Tab; Take 1 Tablet by mouth every day.  Dispense: 90 Tablet; Refill: 2  - URIC ACID; Future

## 2024-08-29 ENCOUNTER — DOCUMENTATION (OUTPATIENT)
Dept: HEALTH INFORMATION MANAGEMENT | Facility: OTHER | Age: 74
End: 2024-08-29
Payer: MEDICARE

## 2024-08-30 RX ORDER — TRIAMTERENE/HYDROCHLOROTHIAZID 37.5-25 MG
TABLET ORAL
Qty: 90 TABLET | Refills: 3 | Status: SHIPPED | OUTPATIENT
Start: 2024-08-30

## 2024-09-05 ENCOUNTER — DOCUMENTATION (OUTPATIENT)
Dept: HEALTH INFORMATION MANAGEMENT | Facility: OTHER | Age: 74
End: 2024-09-05
Payer: MEDICARE

## 2024-09-18 RX ORDER — VALSARTAN 160 MG/1
TABLET ORAL
Qty: 100 TABLET | Refills: 0 | Status: SHIPPED | OUTPATIENT
Start: 2024-09-18

## 2024-09-18 NOTE — TELEPHONE ENCOUNTER
Received request via: Pharmacy    Was the patient seen in the last year in this department? Yes    Does the patient have an active prescription (recently filled or refills available) for medication(s) requested? No    Pharmacy Name:   To be filled at: [facility]/pharmacy #[id number] - [place], [place] - [address]              Does the patient have skilled nursing Plus and need 100-day supply? (This applies to ALL medications) Yes, quantity updated to 100 days

## 2024-11-22 DIAGNOSIS — E78.5 HYPERLIPIDEMIA, UNSPECIFIED HYPERLIPIDEMIA TYPE: ICD-10-CM

## 2024-11-22 RX ORDER — ROSUVASTATIN CALCIUM 10 MG/1
10 TABLET, COATED ORAL EVERY EVENING
Qty: 100 TABLET | Refills: 2 | Status: SHIPPED | OUTPATIENT
Start: 2024-11-22

## 2024-11-22 NOTE — TELEPHONE ENCOUNTER
Received request via: Pharmacy    Was the patient seen in the last year in this department? Yes    Does the patient have an active prescription (recently filled or refills available) for medication(s) requested? No    Pharmacy Name:   [facility]/pharmacy #[id number] - [place], [place] - [address]  [facility] [place] [address]  Phone: [phone] Fax: [phone]       Does the patient have [facility]P Plus and need 100-day supply? (This applies to ALL medications) Yes, quantity updated to 100 days

## 2025-01-24 RX ORDER — AMLODIPINE BESYLATE 5 MG/1
5 TABLET ORAL
Qty: 100 TABLET | Refills: 3 | Status: SHIPPED | OUTPATIENT
Start: 2025-01-24

## 2025-01-24 RX ORDER — VALSARTAN 160 MG/1
160 TABLET ORAL
Qty: 100 TABLET | Refills: 3 | Status: SHIPPED | OUTPATIENT
Start: 2025-01-24

## 2025-01-24 NOTE — TELEPHONE ENCOUNTER
Received request via: Patient    Was the patient seen in the last year in this department? Yes    Does the patient have an active prescription (recently filled or refills available) for medication(s) requested? No    Pharmacy Name:   [facility]/pharmacy #[id number] - [place] - [address]  [facility] [facility]  [place] [address]  Phone: [phone] Fax: [phone]       Does the patient have senior living Plus and need 100-day supply? (This applies to ALL medications) Yes, quantity updated to 100 days

## 2025-02-24 ENCOUNTER — HOSPITAL ENCOUNTER (OUTPATIENT)
Dept: LAB | Facility: MEDICAL CENTER | Age: 75
End: 2025-02-24
Attending: FAMILY MEDICINE
Payer: MEDICARE

## 2025-02-24 DIAGNOSIS — E78.2 MIXED HYPERLIPIDEMIA: ICD-10-CM

## 2025-02-24 DIAGNOSIS — R73.02 IGT (IMPAIRED GLUCOSE TOLERANCE): ICD-10-CM

## 2025-02-24 DIAGNOSIS — I10 ESSENTIAL HYPERTENSION: ICD-10-CM

## 2025-02-24 DIAGNOSIS — M10.9 GOUT, ARTHRITIS: ICD-10-CM

## 2025-02-24 LAB
ALBUMIN SERPL BCP-MCNC: 4.3 G/DL (ref 3.2–4.9)
ALBUMIN/GLOB SERPL: 1.3 G/DL
ALP SERPL-CCNC: 75 U/L (ref 30–99)
ALT SERPL-CCNC: 28 U/L (ref 2–50)
ANION GAP SERPL CALC-SCNC: 12 MMOL/L (ref 7–16)
AST SERPL-CCNC: 27 U/L (ref 12–45)
BASOPHILS # BLD AUTO: 0.6 % (ref 0–1.8)
BASOPHILS # BLD: 0.06 K/UL (ref 0–0.12)
BILIRUB SERPL-MCNC: 0.6 MG/DL (ref 0.1–1.5)
BUN SERPL-MCNC: 12 MG/DL (ref 8–22)
CALCIUM ALBUM COR SERPL-MCNC: 9.2 MG/DL (ref 8.5–10.5)
CALCIUM SERPL-MCNC: 9.4 MG/DL (ref 8.5–10.5)
CHLORIDE SERPL-SCNC: 99 MMOL/L (ref 96–112)
CHOLEST SERPL-MCNC: 108 MG/DL (ref 100–199)
CO2 SERPL-SCNC: 26 MMOL/L (ref 20–33)
CREAT SERPL-MCNC: 0.92 MG/DL (ref 0.5–1.4)
EOSINOPHIL # BLD AUTO: 0.09 K/UL (ref 0–0.51)
EOSINOPHIL NFR BLD: 0.9 % (ref 0–6.9)
ERYTHROCYTE [DISTWIDTH] IN BLOOD BY AUTOMATED COUNT: 43.5 FL (ref 35.9–50)
EST. AVERAGE GLUCOSE BLD GHB EST-MCNC: 151 MG/DL
GFR SERPLBLD CREATININE-BSD FMLA CKD-EPI: 87 ML/MIN/1.73 M 2
GLOBULIN SER CALC-MCNC: 3.2 G/DL (ref 1.9–3.5)
GLUCOSE SERPL-MCNC: 123 MG/DL (ref 65–99)
HBA1C MFR BLD: 6.9 % (ref 4–5.6)
HCT VFR BLD AUTO: 47.4 % (ref 42–52)
HDLC SERPL-MCNC: 41 MG/DL
HGB BLD-MCNC: 15.9 G/DL (ref 14–18)
IMM GRANULOCYTES # BLD AUTO: 0.04 K/UL (ref 0–0.11)
IMM GRANULOCYTES NFR BLD AUTO: 0.4 % (ref 0–0.9)
LDLC SERPL CALC-MCNC: 43 MG/DL
LYMPHOCYTES # BLD AUTO: 1.02 K/UL (ref 1–4.8)
LYMPHOCYTES NFR BLD: 10 % (ref 22–41)
MCH RBC QN AUTO: 30.9 PG (ref 27–33)
MCHC RBC AUTO-ENTMCNC: 33.5 G/DL (ref 32.3–36.5)
MCV RBC AUTO: 92.2 FL (ref 81.4–97.8)
MONOCYTES # BLD AUTO: 0.76 K/UL (ref 0–0.85)
MONOCYTES NFR BLD AUTO: 7.4 % (ref 0–13.4)
NEUTROPHILS # BLD AUTO: 8.28 K/UL (ref 1.82–7.42)
NEUTROPHILS NFR BLD: 80.7 % (ref 44–72)
NRBC # BLD AUTO: 0 K/UL
NRBC BLD-RTO: 0 /100 WBC (ref 0–0.2)
PLATELET # BLD AUTO: 333 K/UL (ref 164–446)
PMV BLD AUTO: 9.5 FL (ref 9–12.9)
POTASSIUM SERPL-SCNC: 4.1 MMOL/L (ref 3.6–5.5)
PROT SERPL-MCNC: 7.5 G/DL (ref 6–8.2)
RBC # BLD AUTO: 5.14 M/UL (ref 4.7–6.1)
SODIUM SERPL-SCNC: 137 MMOL/L (ref 135–145)
TRIGL SERPL-MCNC: 118 MG/DL (ref 0–149)
TSH SERPL-ACNC: 3.35 UIU/ML (ref 0.35–5.5)
URATE SERPL-MCNC: 6.3 MG/DL (ref 2.5–8.3)
WBC # BLD AUTO: 10.3 K/UL (ref 4.8–10.8)

## 2025-02-24 PROCEDURE — 36415 COLL VENOUS BLD VENIPUNCTURE: CPT

## 2025-02-24 PROCEDURE — 84443 ASSAY THYROID STIM HORMONE: CPT

## 2025-02-24 PROCEDURE — 80061 LIPID PANEL: CPT

## 2025-02-24 PROCEDURE — 85025 COMPLETE CBC W/AUTO DIFF WBC: CPT

## 2025-02-24 PROCEDURE — 83036 HEMOGLOBIN GLYCOSYLATED A1C: CPT

## 2025-02-24 PROCEDURE — 80053 COMPREHEN METABOLIC PANEL: CPT

## 2025-02-24 PROCEDURE — 84550 ASSAY OF BLOOD/URIC ACID: CPT

## 2025-02-26 SDOH — ECONOMIC STABILITY: FOOD INSECURITY: WITHIN THE PAST 12 MONTHS, YOU WORRIED THAT YOUR FOOD WOULD RUN OUT BEFORE YOU GOT MONEY TO BUY MORE.: NEVER TRUE

## 2025-02-26 SDOH — HEALTH STABILITY: PHYSICAL HEALTH: ON AVERAGE, HOW MANY MINUTES DO YOU ENGAGE IN EXERCISE AT THIS LEVEL?: 10 MIN

## 2025-02-26 SDOH — HEALTH STABILITY: PHYSICAL HEALTH: ON AVERAGE, HOW MANY DAYS PER WEEK DO YOU ENGAGE IN MODERATE TO STRENUOUS EXERCISE (LIKE A BRISK WALK)?: 1 DAY

## 2025-02-26 SDOH — ECONOMIC STABILITY: FOOD INSECURITY: WITHIN THE PAST 12 MONTHS, THE FOOD YOU BOUGHT JUST DIDN'T LAST AND YOU DIDN'T HAVE MONEY TO GET MORE.: NEVER TRUE

## 2025-02-26 SDOH — ECONOMIC STABILITY: INCOME INSECURITY: IN THE LAST 12 MONTHS, WAS THERE A TIME WHEN YOU WERE NOT ABLE TO PAY THE MORTGAGE OR RENT ON TIME?: NO

## 2025-02-26 SDOH — ECONOMIC STABILITY: INCOME INSECURITY: HOW HARD IS IT FOR YOU TO PAY FOR THE VERY BASICS LIKE FOOD, HOUSING, MEDICAL CARE, AND HEATING?: NOT HARD AT ALL

## 2025-02-26 ASSESSMENT — SOCIAL DETERMINANTS OF HEALTH (SDOH)
IN A TYPICAL WEEK, HOW MANY TIMES DO YOU TALK ON THE PHONE WITH FAMILY, FRIENDS, OR NEIGHBORS?: NEVER
HOW OFTEN DO YOU ATTENT MEETINGS OF THE CLUB OR ORGANIZATION YOU BELONG TO?: NEVER
HOW HARD IS IT FOR YOU TO PAY FOR THE VERY BASICS LIKE FOOD, HOUSING, MEDICAL CARE, AND HEATING?: NOT HARD AT ALL
WITHIN THE PAST 12 MONTHS, YOU WORRIED THAT YOUR FOOD WOULD RUN OUT BEFORE YOU GOT THE MONEY TO BUY MORE: NEVER TRUE
HOW OFTEN DO YOU ATTEND CHURCH OR RELIGIOUS SERVICES?: NEVER
ARE YOU MARRIED, WIDOWED, DIVORCED, SEPARATED, NEVER MARRIED, OR LIVING WITH A PARTNER?: NEVER MARRIED
HOW OFTEN DO YOU ATTENT MEETINGS OF THE CLUB OR ORGANIZATION YOU BELONG TO?: NEVER
HOW OFTEN DO YOU GET TOGETHER WITH FRIENDS OR RELATIVES?: ONCE A WEEK
DO YOU BELONG TO ANY CLUBS OR ORGANIZATIONS SUCH AS CHURCH GROUPS UNIONS, FRATERNAL OR ATHLETIC GROUPS, OR SCHOOL GROUPS?: NO
IN THE PAST 12 MONTHS, HAS THE ELECTRIC, GAS, OIL, OR WATER COMPANY THREATENED TO SHUT OFF SERVICE IN YOUR HOME?: NO
HOW OFTEN DO YOU HAVE A DRINK CONTAINING ALCOHOL: 2-3 TIMES A WEEK
HOW OFTEN DO YOU GET TOGETHER WITH FRIENDS OR RELATIVES?: ONCE A WEEK
HOW MANY DRINKS CONTAINING ALCOHOL DO YOU HAVE ON A TYPICAL DAY WHEN YOU ARE DRINKING: 3 OR 4
HOW OFTEN DO YOU HAVE SIX OR MORE DRINKS ON ONE OCCASION: LESS THAN MONTHLY
ARE YOU MARRIED, WIDOWED, DIVORCED, SEPARATED, NEVER MARRIED, OR LIVING WITH A PARTNER?: NEVER MARRIED
DO YOU BELONG TO ANY CLUBS OR ORGANIZATIONS SUCH AS CHURCH GROUPS UNIONS, FRATERNAL OR ATHLETIC GROUPS, OR SCHOOL GROUPS?: NO
HOW OFTEN DO YOU ATTEND CHURCH OR RELIGIOUS SERVICES?: NEVER
IN A TYPICAL WEEK, HOW MANY TIMES DO YOU TALK ON THE PHONE WITH FAMILY, FRIENDS, OR NEIGHBORS?: NEVER

## 2025-02-26 ASSESSMENT — LIFESTYLE VARIABLES
SKIP TO QUESTIONS 9-10: 0
HOW MANY STANDARD DRINKS CONTAINING ALCOHOL DO YOU HAVE ON A TYPICAL DAY: 3 OR 4
HOW OFTEN DO YOU HAVE SIX OR MORE DRINKS ON ONE OCCASION: LESS THAN MONTHLY
AUDIT-C TOTAL SCORE: 5
HOW OFTEN DO YOU HAVE A DRINK CONTAINING ALCOHOL: 2-3 TIMES A WEEK

## 2025-02-27 ENCOUNTER — OFFICE VISIT (OUTPATIENT)
Dept: MEDICAL GROUP | Facility: MEDICAL CENTER | Age: 75
End: 2025-02-27
Payer: MEDICARE

## 2025-02-27 VITALS
BODY MASS INDEX: 32.8 KG/M2 | WEIGHT: 216.4 LBS | SYSTOLIC BLOOD PRESSURE: 122 MMHG | DIASTOLIC BLOOD PRESSURE: 76 MMHG | TEMPERATURE: 97.9 F | HEART RATE: 77 BPM | RESPIRATION RATE: 19 BRPM | OXYGEN SATURATION: 95 % | HEIGHT: 68 IN

## 2025-02-27 DIAGNOSIS — Z12.11 COLON CANCER SCREENING: ICD-10-CM

## 2025-02-27 DIAGNOSIS — I10 ESSENTIAL HYPERTENSION: ICD-10-CM

## 2025-02-27 DIAGNOSIS — D12.5 BENIGN NEOPLASM OF SIGMOID COLON: ICD-10-CM

## 2025-02-27 DIAGNOSIS — R05.1 ACUTE COUGH: ICD-10-CM

## 2025-02-27 DIAGNOSIS — E11.65 TYPE 2 DIABETES MELLITUS WITH HYPERGLYCEMIA, WITHOUT LONG-TERM CURRENT USE OF INSULIN (HCC): ICD-10-CM

## 2025-02-27 DIAGNOSIS — M10.9 GOUT, ARTHRITIS: ICD-10-CM

## 2025-02-27 DIAGNOSIS — L29.9 PRURITUS: ICD-10-CM

## 2025-02-27 DIAGNOSIS — E78.2 MIXED HYPERLIPIDEMIA: ICD-10-CM

## 2025-02-27 PROBLEM — Z23 FLU VACCINE NEED: Status: RESOLVED | Noted: 2017-10-17 | Resolved: 2025-02-27

## 2025-02-27 ASSESSMENT — FIBROSIS 4 INDEX: FIB4 SCORE: 1.12
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
## 2025-03-05 ENCOUNTER — RESULTS FOLLOW-UP (OUTPATIENT)
Dept: MEDICAL GROUP | Facility: MEDICAL CENTER | Age: 75
End: 2025-03-05
Payer: MEDICARE

## 2025-03-24 ENCOUNTER — PATIENT MESSAGE (OUTPATIENT)
Dept: HEALTH INFORMATION MANAGEMENT | Facility: OTHER | Age: 75
End: 2025-03-24

## 2025-05-27 ENCOUNTER — APPOINTMENT (OUTPATIENT)
Dept: MEDICAL GROUP | Facility: MEDICAL CENTER | Age: 75
End: 2025-05-27
Payer: MEDICARE

## 2025-05-27 DIAGNOSIS — M10.9 GOUT, ARTHRITIS: ICD-10-CM

## 2025-05-27 RX ORDER — ALLOPURINOL 100 MG/1
100 TABLET ORAL DAILY
Qty: 100 TABLET | Refills: 3 | Status: SHIPPED | OUTPATIENT
Start: 2025-05-27

## 2025-07-31 ENCOUNTER — HOSPITAL ENCOUNTER (OUTPATIENT)
Facility: MEDICAL CENTER | Age: 75
End: 2025-07-31
Attending: FAMILY MEDICINE
Payer: MEDICARE

## 2025-07-31 ENCOUNTER — APPOINTMENT (OUTPATIENT)
Dept: MEDICAL GROUP | Facility: MEDICAL CENTER | Age: 75
End: 2025-07-31
Payer: MEDICARE

## 2025-07-31 VITALS
TEMPERATURE: 97.2 F | SYSTOLIC BLOOD PRESSURE: 130 MMHG | HEART RATE: 73 BPM | OXYGEN SATURATION: 94 % | HEIGHT: 68 IN | RESPIRATION RATE: 14 BRPM | DIASTOLIC BLOOD PRESSURE: 68 MMHG | WEIGHT: 218.4 LBS | BODY MASS INDEX: 33.1 KG/M2

## 2025-07-31 DIAGNOSIS — I10 ESSENTIAL HYPERTENSION: ICD-10-CM

## 2025-07-31 DIAGNOSIS — E11.65 TYPE 2 DIABETES MELLITUS WITH HYPERGLYCEMIA, WITHOUT LONG-TERM CURRENT USE OF INSULIN (HCC): Primary | ICD-10-CM

## 2025-07-31 DIAGNOSIS — B35.1 ONYCHOMYCOSIS: ICD-10-CM

## 2025-07-31 DIAGNOSIS — D12.5 BENIGN NEOPLASM OF SIGMOID COLON: ICD-10-CM

## 2025-07-31 DIAGNOSIS — E11.65 TYPE 2 DIABETES MELLITUS WITH HYPERGLYCEMIA, WITHOUT LONG-TERM CURRENT USE OF INSULIN (HCC): ICD-10-CM

## 2025-07-31 DIAGNOSIS — E78.5 HYPERLIPIDEMIA, UNSPECIFIED HYPERLIPIDEMIA TYPE: ICD-10-CM

## 2025-07-31 DIAGNOSIS — Z12.11 COLON CANCER SCREENING: ICD-10-CM

## 2025-07-31 DIAGNOSIS — M10.9 GOUT, ARTHRITIS: ICD-10-CM

## 2025-07-31 LAB
HBA1C MFR BLD: 6 % (ref ?–5.8)
POCT INT CON NEG: NEGATIVE
POCT INT CON POS: POSITIVE

## 2025-07-31 PROCEDURE — 82570 ASSAY OF URINE CREATININE: CPT

## 2025-07-31 PROCEDURE — 82043 UR ALBUMIN QUANTITATIVE: CPT

## 2025-07-31 RX ORDER — TRIAMTERENE AND HYDROCHLOROTHIAZIDE 37.5; 25 MG/1; MG/1
1 TABLET ORAL
Qty: 100 TABLET | Refills: 3 | Status: SHIPPED | OUTPATIENT
Start: 2025-07-31

## 2025-07-31 RX ORDER — ROSUVASTATIN CALCIUM 10 MG/1
10 TABLET, COATED ORAL EVERY EVENING
Qty: 100 TABLET | Refills: 3 | Status: SHIPPED | OUTPATIENT
Start: 2025-07-31

## 2025-07-31 RX ORDER — TERBINAFINE HYDROCHLORIDE 250 MG/1
250 TABLET ORAL DAILY
Qty: 90 TABLET | Refills: 0 | Status: SHIPPED | OUTPATIENT
Start: 2025-07-31

## 2025-07-31 ASSESSMENT — PATIENT HEALTH QUESTIONNAIRE - PHQ9: CLINICAL INTERPRETATION OF PHQ2 SCORE: 0

## 2025-07-31 ASSESSMENT — FIBROSIS 4 INDEX: FIB4 SCORE: 1.133893419027681682

## 2025-07-31 NOTE — PROGRESS NOTES
"Verbal consent was acquired by the patient to use [employer] ambient listening note generation during this visit    Subjective:     CC: \"diabetes\"    History of Present Illness  The patient presents for evaluation of diabetes management, hypertension, gout, and fungal nail disease.    Diabetes Management and Hypertension  He reports an unintentional weight loss, noting his weight previously peaked at 225 pounds. He has not made any significant dietary changes but has reduced his ice cream intake. He prepares his own meals and rarely dines out due to dental issues. He enjoys vegetables.  - Onset: Unintentional weight loss noted recently.  - Character: Weight loss without significant dietary changes; reduced ice cream intake.  - Alleviating/Aggravating Factors: Prepares his own meals; rarely dines out due to dental issues.    Fungal Nail Disease  He reports no numbness or tingling in his feet. He uses a Dremel tool a few times a month to manage the thickened nail on his little toe, which he likens to a claw. He applies a face cream to his hands and arms.  - Onset: Chronic issue with thickened nail.  - Location: Little toe.  - Character: Thickened nail likened to a claw.  - Timing: Uses a Dremel tool a few times a month.    Colon Cancer Screening and Bruising  He has been postponing his colon cancer screening for several years. He reports easy bruising but does not experience frequent nosebleeds, gum bleeding, or blood in his stool.  - Onset: colon cancer screening postponed for several years on patient's choice.  - Character: Easy bruising without associated nosebleeds, gum bleeding, or blood in stool.    COVID-19 and Other Vaccinations  He has received 8 or 9 COVID-19 vaccines and is up to date with his influenza, RSV, shingles, and tetanus vaccines.    Oral Surgeries  He underwent two oral surgeries under his tongue about 2 to 3 weeks apart for a precancerous growth, which was successfully removed.  - Onset: " "Surgeries performed 2 to 3 weeks apart.  - Location: Under the tongue.  - Character: Precancerous growth successfully removed.    Gout  He has not experienced any recent gout attacks, with the last one occurring decades ago. His first gout attack happened when he was 48 years old.  - Onset: First gout attack at age 48; last attack occurred decades ago.  - Timing: No recent gout attacks.    Diet  He prepares his own meals and rarely dines out due to dental issues. He enjoys vegetables.          Objective:     Exam:  /68 (BP Location: Right arm, Patient Position: Sitting, BP Cuff Size: Adult)   Pulse 73   Temp 36.2 °C (97.2 °F) (Temporal)   Resp 14   Ht 1.727 m (5' 8\")   Wt 99.1 kg (218 lb 6.4 oz)   SpO2 94%   BMI 33.21 kg/m²  Body mass index is 33.21 kg/m².    Physical Exam  General Appearance: Normal.  Vital signs: Within normal limits.  HEENT: Within normal limits.  Respiratory: Clear to auscultation, no wheezing, rales or rhonchi.  Cardiovascular: Regular rate and rhythm, no murmurs, rubs, or gallops.  Extremities: Good pulses bilaterally, dry skin, thick nails with fungal disease.  Skin: Dry skin on feet.  Neurological: Normal.  Physical Exam        Results  Labs   - A1c: 6.0   - A1c: 6.9   - Platelets: 02/2025, 333      Assessment & Plan:       1. Type 2 diabetes mellitus with hyperglycemia, without long-term current use of insulin ([facility])  - POCT Retinal Eye Exam  - POCT A1C  - Microalbumin Creat Ratio Urine (Clinic Collect); Future  - Diabetic Monofilament LE Exam  - Comp Metabolic Panel; Future  - CBC WITHOUT DIFFERENTIAL; Future  - Comp Metabolic Panel; Future  - HEMOGLOBIN A1C; Future  - Lipid Profile; Future    2. Onychomycosis  - terbinafine (LAMISIL) 250 MG Tab; Take 1 Tablet by mouth every day.  Dispense: 90 Tablet; Refill: 0  - Comp Metabolic Panel; Future  - CBC WITHOUT DIFFERENTIAL; Future  - Comp Metabolic Panel; Future    3. Essential hypertension  - triamterene-hctz (MAXZIDE-25/DYAZIDE) " 37.5-25 MG Tab; Take 1 Tablet by mouth every day.  Dispense: 100 Tablet; Refill: 3  - CBC WITHOUT DIFFERENTIAL; Future  - Comp Metabolic Panel; Future    4. Hyperlipidemia, unspecified hyperlipidemia type  - rosuvastatin (CRESTOR) 10 MG Tab; Take 1 Tablet by mouth every evening.  Dispense: 100 Tablet; Refill: 3  - Comp Metabolic Panel; Future  - Lipid Profile; Future    5. Gout, arthritis  - Comp Metabolic Panel; Future  - URIC ACID; Future    6. Benign neoplasm of sigmoid colon  - Referral to GI for Colonoscopy    7. Colon cancer screening  - Referral to GI for Colonoscopy      Assessment & Plan  1. Diabetes mellitus: Improved.  - A1c has improved from 6.9 to 6.0.  - Continue monitoring diet, particularly reducing ice cream intake.  - Check feet nightly and apply moisturizer to manage dry skin.  - Conduct urine test to assess kidney function and ensure no proteinuria.  - Perform eye examination today.    2. Fungal nail disease: Chronic.  - Thickened nails with fungal infection.  - Prescribed Terbinafine 250 mg tablet once daily for 3 months.  - Conduct non-fasting lab test FDC through treatment to monitor liver and kidney function.    3. Hypertension: Stable.  - Blood pressure is well-controlled at 130/68.  - Continue current medications: Valsartan 160 mg, Triamterene 37.5 mg/Hydrochlorothiazide 25 mg, and Amlodipine 5 mg.  - Elevated blood pressure readings at the periodontist's office likely due to situational factors.    4. Hyperlipidemia: Stable.  - Continue Rosuvastatin 10 mg for cholesterol management.  - Prescription sent to pharmacy to ensure continuity.    5. Gout: Stable.  - No recent gout attacks.  - Continue Allopurinol 100 mg.    6. Health maintenance.  - Up to date with RSV, shingles, tetanus, and flu vaccinations.  - Referral for colonoscopy made as overdue for colon cancer screening.  - Fasting labs ordered for annual visit in winter.    Follow-up  - Follow up in 6 months for an annual visit or  sooner if necessary.         Return in about 6 months (around 1/31/2026), or if symptoms worsen or fail to improve, for Annual Visit.    Billing : secondary to the complexity of this patient's illnesses and their interactions.  See assessment and plan above for the comprehensive evaluation and management of the patient's acute and chronic concerns.  As the patient's PCP, I am the continued focal point for all health care services for the patient's needs and ongoing subsequent medical care.  All problems listed were discussed during the office visit, medications were evaluated and complexities were discussed, as well as plan for the future.     This note was created using voice recognition software (Dragon). The accuracy of the dictation is limited by the abilities of the software. I have reviewed the note prior to signing, however some errors in grammar and context are still possible. If you have any questions related to this note please do not hesitate to contact our office.

## 2025-08-01 ENCOUNTER — RESULTS FOLLOW-UP (OUTPATIENT)
Dept: MEDICAL GROUP | Facility: MEDICAL CENTER | Age: 75
End: 2025-08-01
Payer: MEDICARE

## 2025-08-01 LAB
CREAT UR-MCNC: 102 MG/DL
MICROALBUMIN UR-MCNC: <1.2 MG/DL
MICROALBUMIN/CREAT UR: NORMAL MG/G (ref 0–30)

## (undated) DEVICE — TUBE NG SALEM SUMP 16FR (50EA/CA)

## (undated) DEVICE — BLADE SURGICAL #15 - (50/BX 3BX/CA)

## (undated) DEVICE — MASK ANESTHESIA ADULT  - (100/CA)

## (undated) DEVICE — SUTURE 3-0 VICRYL PLUS SH - 8X 18 INCH (12/BX)

## (undated) DEVICE — SEAL CANNULA STAPLER 12 MM (10EA/BX)

## (undated) DEVICE — SCISSORS 5MM CVD (6EA/BX)

## (undated) DEVICE — SENSOR SPO2 NEO LNCS ADHESIVE (20/BX) SEE USER NOTES

## (undated) DEVICE — DERMABOND ADVANCED - (12EA/BX)

## (undated) DEVICE — DEVICE SUTURING NON-SAFETY ENDO CLOSE (12/BX)

## (undated) DEVICE — DRAPE COLUMN  BOX OF 20

## (undated) DEVICE — SLEEVE, VASO, THIGH, MED

## (undated) DEVICE — COVER TIP ENDOWRIST HOT SHEAR - (10EA/BX) DA VINCI

## (undated) DEVICE — KIT SURGIFLO W/OUT THROMBIN - (6EA/CA)

## (undated) DEVICE — SHEATH STAPLER 45 DAVINCI (10EA/BX)

## (undated) DEVICE — WATER IRRIG. STER. 1500 ML - (9/CA)

## (undated) DEVICE — REDUCER XI STAPLER 12MM TO 8MM (6EA/BX)

## (undated) DEVICE — GLOVE BIOGEL SZ 7.5 SURGICAL PF LTX - (50PR/BX 4BX/CA)

## (undated) DEVICE — BLADE SURGICAL CLIPPER - (50EA/CA)

## (undated) DEVICE — TAPE CLOTH MEDIPORE 6 INCH - (12RL/CA)

## (undated) DEVICE — PAD LAP STERILE 18 X 18 - (5/PK 40PK/CA)

## (undated) DEVICE — PACK DAVINCI LOW ANTERIOR RESECTION (1EA/CA)

## (undated) DEVICE — SEALER VESSEL DA VINCI XI (6EA/CA)

## (undated) DEVICE — SET LEADWIRE 5 LEAD BEDSIDE DISPOSABLE ECG (1SET OF 5/EA)

## (undated) DEVICE — TROCAR 5X100 NON BLADED Z-TH - READ KII (6/BX)

## (undated) DEVICE — TUBE E-T HI-LO CUFF 8.0MM (10EA/PK)

## (undated) DEVICE — APPLICATOR SURGIFLO - (6EA/CA)

## (undated) DEVICE — STAPLER 29MM CIRCULAR CURVED - (3EA/BX)

## (undated) DEVICE — PACK DAVINCI LOW ANTERIOR RESECTION

## (undated) DEVICE — Device

## (undated) DEVICE — TRAY CATHETER FOLEY URINE METER W/STATLOCK 350ML (10EA/CA)

## (undated) DEVICE — SUTURE 2-0 SILK SH (36PK/BX)

## (undated) DEVICE — SEAL 5MM-8MM UNIVERSAL  BOX OF 10

## (undated) DEVICE — CHLORAPREP 26 ML APPLICATOR - ORANGE TINT(25/CA)

## (undated) DEVICE — GLOVE BIOGEL PI INDICATOR SZ 7.0 SURGICAL PF LF - (50/BX 4BX/CA)

## (undated) DEVICE — RELOAD STAPLER WHITE ENDOWRIST 45 (12EA/BX)

## (undated) DEVICE — GLOVE BIOGEL SZ 7 SURGICAL PF LTX - (50PR/BX 4BX/CA)

## (undated) DEVICE — CANISTER SUCTION 3000ML MECHANICAL FILTER AUTO SHUTOFF MEDI-VAC NONSTERILE LF DISP  (40EA/CA)

## (undated) DEVICE — OBTURATOR BLADELESS STANDARD 8MM (6EA/BX)

## (undated) DEVICE — SPONGE GAUZESTER 4 X 4 4PLY - (128PK/CA)

## (undated) DEVICE — NEPTUNE 4 PORT MANIFOLD - (20/PK)

## (undated) DEVICE — GOWN WARMING STANDARD FLEX - (30/CA)

## (undated) DEVICE — TUBING CLEARLINK DUO-VENT - C-FLO (48EA/CA)

## (undated) DEVICE — SUTURE 4-0 MONOCRYL PLUSPC-3 - 18 INCH (12/BX)

## (undated) DEVICE — DRAPE ARM  BOX OF 20

## (undated) DEVICE — SUCTION INSTRUMENT YANKAUER BULBOUS TIP W/O VENT (50EA/CA)

## (undated) DEVICE — HEAD HOLDER JUNIOR/ADULT

## (undated) DEVICE — SUTURE 0 COATED VICRYL 6-18IN - (12PK/BX)

## (undated) DEVICE — SET SUCTION/IRRIGATION WITH DISPOSABLE TIP (6/CA )PART #0250-070-520 IS A SUB

## (undated) DEVICE — SUTURE 0 PDS CT-2 (36PK/BX)

## (undated) DEVICE — SYRINGE SAFETY 3 ML 18 GA X 1 1/2 BLUNT LL (100/BX 8BX/CA)

## (undated) DEVICE — PROTECTOR ULNA NERVE - (36PR/CA)

## (undated) DEVICE — LACTATED RINGERS INJ 1000 ML - (14EA/CA 60CA/PF)

## (undated) DEVICE — SPONGE DRAIN 4 X 4IN 6-PLY - (2/PK25PK/BX12BX/CS)

## (undated) DEVICE — SODIUM CHL IRRIGATION 0.9% 1000ML (12EA/CA)

## (undated) DEVICE — ROBOTIC SURGERY SERVICES

## (undated) DEVICE — ELECTRODE DUAL RETURN W/ CORD - (50/PK)

## (undated) DEVICE — KIT ANESTHESIA W/CIRCUIT & 3/LT BAG W/FILTER (20EA/CA)

## (undated) DEVICE — RESERVOIR SUCTION 100 CC - SILICONE (20EA/CA)

## (undated) DEVICE — ELECTRODE 850 FOAM ADHESIVE - HYDROGEL RADIOTRNSPRNT (50/PK)

## (undated) DEVICE — GLOVE BIOGEL SZ 6.5 SURGICAL PF LTX (50PR/BX 4BX/CA)

## (undated) DEVICE — PAD OR TABLE DA VINCI 2IN X 20IN X 72IN - (12EA/CA)

## (undated) DEVICE — KIT ROOM DECONTAMINATION

## (undated) DEVICE — RELOAD STAPLER GREEN ENDOWRIST 45 (12EA/BX)

## (undated) DEVICE — LEAD SET 6 DISP. EKG NIHON KOHDEN

## (undated) DEVICE — SUTURE GENERAL

## (undated) DEVICE — GLOVE BIOGEL INDICATOR SZ 7SURGICAL PF LTX - (50/BX 4BX/CA)

## (undated) DEVICE — SET EXTENSION WITH 2 PORTS (48EA/CA) ***PART #2C8610 IS A SUBSTITUTE*****

## (undated) DEVICE — SUTURE NABSB 2-0 KS 30IN PRLN BLUE (36PK/BX)